# Patient Record
Sex: FEMALE | Race: WHITE | NOT HISPANIC OR LATINO | Employment: OTHER | URBAN - METROPOLITAN AREA
[De-identification: names, ages, dates, MRNs, and addresses within clinical notes are randomized per-mention and may not be internally consistent; named-entity substitution may affect disease eponyms.]

---

## 2023-03-13 LAB
ALANINE AMINOTRANSFERASE (SGPT) (U/L) IN SER/PLAS: 18 U/L (ref 7–45)
ALBUMIN (G/DL) IN SER/PLAS: 4.4 G/DL (ref 3.4–5)
ALKALINE PHOSPHATASE (U/L) IN SER/PLAS: 71 U/L (ref 33–136)
ANION GAP IN SER/PLAS: 14 MMOL/L (ref 10–20)
ASPARTATE AMINOTRANSFERASE (SGOT) (U/L) IN SER/PLAS: 17 U/L (ref 9–39)
BILIRUBIN TOTAL (MG/DL) IN SER/PLAS: 0.7 MG/DL (ref 0–1.2)
CALCIUM (MG/DL) IN SER/PLAS: 10.2 MG/DL (ref 8.6–10.6)
CARBON DIOXIDE, TOTAL (MMOL/L) IN SER/PLAS: 28 MMOL/L (ref 21–32)
CHLORIDE (MMOL/L) IN SER/PLAS: 103 MMOL/L (ref 98–107)
CHOLESTEROL (MG/DL) IN SER/PLAS: 209 MG/DL (ref 0–199)
CHOLESTEROL IN HDL (MG/DL) IN SER/PLAS: 75.8 MG/DL
CHOLESTEROL/HDL RATIO: 2.8
CREATININE (MG/DL) IN SER/PLAS: 0.73 MG/DL (ref 0.5–1.05)
ERYTHROCYTE DISTRIBUTION WIDTH (RATIO) BY AUTOMATED COUNT: 12.7 % (ref 11.5–14.5)
ERYTHROCYTE MEAN CORPUSCULAR HEMOGLOBIN CONCENTRATION (G/DL) BY AUTOMATED: 32.2 G/DL (ref 32–36)
ERYTHROCYTE MEAN CORPUSCULAR VOLUME (FL) BY AUTOMATED COUNT: 90 FL (ref 80–100)
ERYTHROCYTES (10*6/UL) IN BLOOD BY AUTOMATED COUNT: 5.22 X10E12/L (ref 4–5.2)
GFR FEMALE: 86 ML/MIN/1.73M2
GLUCOSE (MG/DL) IN SER/PLAS: 84 MG/DL (ref 74–99)
HEMATOCRIT (%) IN BLOOD BY AUTOMATED COUNT: 46.9 % (ref 36–46)
HEMOGLOBIN (G/DL) IN BLOOD: 15.1 G/DL (ref 12–16)
LDL: 108 MG/DL (ref 0–99)
LEUKOCYTES (10*3/UL) IN BLOOD BY AUTOMATED COUNT: 6.1 X10E9/L (ref 4.4–11.3)
NRBC (PER 100 WBCS) BY AUTOMATED COUNT: 0 /100 WBC (ref 0–0)
PLATELETS (10*3/UL) IN BLOOD AUTOMATED COUNT: 242 X10E9/L (ref 150–450)
POTASSIUM (MMOL/L) IN SER/PLAS: 4 MMOL/L (ref 3.5–5.3)
PROTEIN TOTAL: 6.7 G/DL (ref 6.4–8.2)
SODIUM (MMOL/L) IN SER/PLAS: 141 MMOL/L (ref 136–145)
THYROTROPIN (MIU/L) IN SER/PLAS BY DETECTION LIMIT <= 0.05 MIU/L: 3.05 MIU/L (ref 0.44–3.98)
TRIGLYCERIDE (MG/DL) IN SER/PLAS: 128 MG/DL (ref 0–149)
UREA NITROGEN (MG/DL) IN SER/PLAS: 20 MG/DL (ref 6–23)
VLDL: 26 MG/DL (ref 0–40)

## 2023-10-16 ENCOUNTER — TELEPHONE (OUTPATIENT)
Dept: PRIMARY CARE | Facility: CLINIC | Age: 73
End: 2023-10-16
Payer: MEDICARE

## 2023-10-16 NOTE — TELEPHONE ENCOUNTER
----- Message from Anish Barnes DO sent at 10/16/2023  9:15 AM EDT -----  Cologuard test was negative.  Can repeat in 3 years

## 2024-02-05 ENCOUNTER — OFFICE VISIT (OUTPATIENT)
Dept: PRIMARY CARE | Facility: CLINIC | Age: 74
End: 2024-02-05
Payer: MEDICARE

## 2024-02-05 VITALS
TEMPERATURE: 98 F | HEART RATE: 90 BPM | WEIGHT: 175 LBS | OXYGEN SATURATION: 95 % | SYSTOLIC BLOOD PRESSURE: 138 MMHG | BODY MASS INDEX: 29.88 KG/M2 | DIASTOLIC BLOOD PRESSURE: 78 MMHG | HEIGHT: 64 IN

## 2024-02-05 DIAGNOSIS — E66.9 OBESITY (BMI 30-39.9): ICD-10-CM

## 2024-02-05 DIAGNOSIS — J30.1 SEASONAL ALLERGIC RHINITIS DUE TO POLLEN: ICD-10-CM

## 2024-02-05 DIAGNOSIS — I73.00 RAYNAUD'S DISEASE WITHOUT GANGRENE: ICD-10-CM

## 2024-02-05 DIAGNOSIS — Z12.31 BREAST CANCER SCREENING BY MAMMOGRAM: ICD-10-CM

## 2024-02-05 DIAGNOSIS — Z00.00 WELLNESS EXAMINATION: ICD-10-CM

## 2024-02-05 DIAGNOSIS — Z12.11 ENCOUNTER FOR SCREENING FOR MALIGNANT NEOPLASM OF COLON: ICD-10-CM

## 2024-02-05 DIAGNOSIS — I10 HTN (HYPERTENSION), BENIGN: ICD-10-CM

## 2024-02-05 DIAGNOSIS — E78.2 MIXED HYPERLIPIDEMIA: ICD-10-CM

## 2024-02-05 DIAGNOSIS — L71.9 ROSACEA: Primary | ICD-10-CM

## 2024-02-05 PROCEDURE — 3075F SYST BP GE 130 - 139MM HG: CPT | Performed by: FAMILY MEDICINE

## 2024-02-05 PROCEDURE — 1159F MED LIST DOCD IN RCRD: CPT | Performed by: FAMILY MEDICINE

## 2024-02-05 PROCEDURE — 1036F TOBACCO NON-USER: CPT | Performed by: FAMILY MEDICINE

## 2024-02-05 PROCEDURE — 99214 OFFICE O/P EST MOD 30 MIN: CPT | Performed by: FAMILY MEDICINE

## 2024-02-05 PROCEDURE — 3078F DIAST BP <80 MM HG: CPT | Performed by: FAMILY MEDICINE

## 2024-02-05 RX ORDER — TRIAMTERENE AND HYDROCHLOROTHIAZIDE 37.5; 25 MG/1; MG/1
1 CAPSULE ORAL DAILY
COMMUNITY
End: 2024-02-05 | Stop reason: SDUPTHER

## 2024-02-05 RX ORDER — FLUTICASONE PROPIONATE 50 MCG
2 SPRAY, SUSPENSION (ML) NASAL
COMMUNITY
Start: 2019-11-06 | End: 2024-02-05 | Stop reason: SDUPTHER

## 2024-02-05 RX ORDER — CLOBETASOL PROPIONATE 0.05 MG/G
1 GEL TOPICAL 2 TIMES DAILY
COMMUNITY
Start: 2023-06-28 | End: 2024-02-05 | Stop reason: WASHOUT

## 2024-02-05 RX ORDER — AMLODIPINE BESYLATE 2.5 MG/1
2.5 TABLET ORAL DAILY
Qty: 90 TABLET | Refills: 1 | Status: SHIPPED | OUTPATIENT
Start: 2024-02-05

## 2024-02-05 RX ORDER — PRAVASTATIN SODIUM 80 MG/1
80 TABLET ORAL DAILY
COMMUNITY
End: 2024-02-05 | Stop reason: SDUPTHER

## 2024-02-05 RX ORDER — DOXYCYCLINE HYCLATE 50 MG/1
50 CAPSULE ORAL DAILY
Qty: 90 CAPSULE | Refills: 1 | Status: SHIPPED | OUTPATIENT
Start: 2024-02-05

## 2024-02-05 RX ORDER — ALBUTEROL SULFATE 90 UG/1
AEROSOL, METERED RESPIRATORY (INHALATION)
COMMUNITY
Start: 2022-08-08

## 2024-02-05 RX ORDER — TRIAMTERENE AND HYDROCHLOROTHIAZIDE 37.5; 25 MG/1; MG/1
1 CAPSULE ORAL DAILY
Qty: 90 CAPSULE | Refills: 1 | Status: SHIPPED | OUTPATIENT
Start: 2024-02-05

## 2024-02-05 RX ORDER — FLUTICASONE PROPIONATE 50 MCG
2 SPRAY, SUSPENSION (ML) NASAL
Qty: 16 G | Refills: 11 | Status: SHIPPED | OUTPATIENT
Start: 2024-02-05

## 2024-02-05 RX ORDER — AMLODIPINE BESYLATE 2.5 MG/1
2.5 TABLET ORAL DAILY
COMMUNITY
End: 2024-02-05 | Stop reason: SDUPTHER

## 2024-02-05 RX ORDER — PRAVASTATIN SODIUM 80 MG/1
80 TABLET ORAL DAILY
Qty: 90 TABLET | Refills: 1 | Status: SHIPPED | OUTPATIENT
Start: 2024-02-05

## 2024-02-05 ASSESSMENT — PATIENT HEALTH QUESTIONNAIRE - PHQ9
1. LITTLE INTEREST OR PLEASURE IN DOING THINGS: NOT AT ALL
2. FEELING DOWN, DEPRESSED OR HOPELESS: NOT AT ALL
SUM OF ALL RESPONSES TO PHQ9 QUESTIONS 1 AND 2: 0

## 2024-02-05 NOTE — PROGRESS NOTES
Patient is here 6-month follow-up.  Her  has not quite been the same since he had his appendix removed last year has been falling a lot at home dragging his foot.  This has been a little stressful the patient has got little bit of help I am going on seeing her girlfriends.  He is now also in therapy.  She still having the some redness in the face.  No chest pain or palpitations.    REVIEW OF SYSTEMS: 12 systems negative except for those mentioned in the HPI.    PHYSICAL EXAMINATION:   Constitutional: The patient is alert, in no acute  distress.  Eyes: Extraocular movements are intact.   ENT: external ear canals patent  Neck: no  JVD.  Heart: no JVD  Lungs: Normal respiration without stridor or nasal flaring   Psychiatric: Good judgment and insight. Normal affect and mood.  Skin: Slight redness to the nose and cheeks  Assessment:  per EMR    Plan:  Will try doxycycline for the rosacea.  Blood pressure as well as Flonase cholesterol medication refilled.  Will have annual blood work.  Had a Cologuard that was negative back in October.  Otherwise she is doing well follow-up in 6 months for annual calcium score test was also reviewed    This dictation was created using Dragon dictation and may contain errors

## 2024-02-07 ENCOUNTER — LAB (OUTPATIENT)
Dept: LAB | Facility: LAB | Age: 74
End: 2024-02-07
Payer: MEDICARE

## 2024-02-07 ENCOUNTER — HOSPITAL ENCOUNTER (OUTPATIENT)
Dept: RADIOLOGY | Facility: CLINIC | Age: 74
Discharge: HOME | End: 2024-02-07
Payer: MEDICARE

## 2024-02-07 DIAGNOSIS — I73.00 RAYNAUD'S DISEASE WITHOUT GANGRENE: ICD-10-CM

## 2024-02-07 DIAGNOSIS — Z12.31 BREAST CANCER SCREENING BY MAMMOGRAM: ICD-10-CM

## 2024-02-07 DIAGNOSIS — Z00.00 WELLNESS EXAMINATION: ICD-10-CM

## 2024-02-07 DIAGNOSIS — E78.2 MIXED HYPERLIPIDEMIA: ICD-10-CM

## 2024-02-07 DIAGNOSIS — I10 HTN (HYPERTENSION), BENIGN: ICD-10-CM

## 2024-02-07 DIAGNOSIS — L71.9 ROSACEA: ICD-10-CM

## 2024-02-07 LAB
CREAT UR-MCNC: 169.4 MG/DL (ref 20–320)
MICROALBUMIN UR-MCNC: 12.6 MG/L
MICROALBUMIN/CREAT UR: 7.4 UG/MG CREAT

## 2024-02-07 PROCEDURE — 77063 BREAST TOMOSYNTHESIS BI: CPT | Performed by: RADIOLOGY

## 2024-02-07 PROCEDURE — 82570 ASSAY OF URINE CREATININE: CPT

## 2024-02-07 PROCEDURE — 77067 SCR MAMMO BI INCL CAD: CPT | Performed by: RADIOLOGY

## 2024-02-07 PROCEDURE — 77067 SCR MAMMO BI INCL CAD: CPT

## 2024-02-07 PROCEDURE — 82043 UR ALBUMIN QUANTITATIVE: CPT

## 2024-02-13 ENCOUNTER — HOSPITAL ENCOUNTER (OUTPATIENT)
Dept: RADIOLOGY | Facility: EXTERNAL LOCATION | Age: 74
Discharge: HOME | End: 2024-02-13

## 2024-04-02 PROBLEM — R79.89 ELEVATED FERRITIN: Status: ACTIVE | Noted: 2024-04-02

## 2024-04-03 ENCOUNTER — LAB (OUTPATIENT)
Dept: LAB | Facility: CLINIC | Age: 74
End: 2024-04-03
Payer: MEDICARE

## 2024-04-03 DIAGNOSIS — E78.2 MIXED HYPERLIPIDEMIA: ICD-10-CM

## 2024-04-03 DIAGNOSIS — I73.00 RAYNAUD'S DISEASE WITHOUT GANGRENE: ICD-10-CM

## 2024-04-03 DIAGNOSIS — I10 HTN (HYPERTENSION), BENIGN: ICD-10-CM

## 2024-04-03 DIAGNOSIS — R79.89 ELEVATED FERRITIN: ICD-10-CM

## 2024-04-03 DIAGNOSIS — D75.1 ERYTHROCYTOSIS: ICD-10-CM

## 2024-04-03 DIAGNOSIS — Z00.00 WELLNESS EXAMINATION: ICD-10-CM

## 2024-04-03 DIAGNOSIS — L71.9 ROSACEA: ICD-10-CM

## 2024-04-03 LAB
ALBUMIN SERPL BCP-MCNC: 4.3 G/DL (ref 3.4–5)
ALP SERPL-CCNC: 69 U/L (ref 33–136)
ALT SERPL W P-5'-P-CCNC: 15 U/L (ref 7–45)
ANION GAP SERPL CALC-SCNC: 10 MMOL/L (ref 10–20)
AST SERPL W P-5'-P-CCNC: 15 U/L (ref 9–39)
BASOPHILS # BLD AUTO: 0.03 X10*3/UL (ref 0–0.1)
BASOPHILS NFR BLD AUTO: 0.6 %
BILIRUB SERPL-MCNC: 0.7 MG/DL (ref 0–1.2)
BUN SERPL-MCNC: 23 MG/DL (ref 6–23)
CALCIUM SERPL-MCNC: 10.2 MG/DL (ref 8.6–10.3)
CHLORIDE SERPL-SCNC: 102 MMOL/L (ref 98–107)
CHOLEST SERPL-MCNC: 200 MG/DL (ref 0–199)
CHOLESTEROL/HDL RATIO: 2.7
CO2 SERPL-SCNC: 31 MMOL/L (ref 21–32)
CREAT SERPL-MCNC: 0.85 MG/DL (ref 0.5–1.05)
EGFRCR SERPLBLD CKD-EPI 2021: 72 ML/MIN/1.73M*2
EOSINOPHIL # BLD AUTO: 0.15 X10*3/UL (ref 0–0.4)
EOSINOPHIL NFR BLD AUTO: 2.9 %
ERYTHROCYTE [DISTWIDTH] IN BLOOD BY AUTOMATED COUNT: 12.8 % (ref 11.5–14.5)
FERRITIN SERPL-MCNC: 148 NG/ML (ref 8–150)
GLUCOSE SERPL-MCNC: 92 MG/DL (ref 74–99)
HCT VFR BLD AUTO: 47.3 % (ref 36–46)
HDLC SERPL-MCNC: 74.7 MG/DL
HGB BLD-MCNC: 15.4 G/DL (ref 12–16)
IMM GRANULOCYTES # BLD AUTO: 0.01 X10*3/UL (ref 0–0.5)
IMM GRANULOCYTES NFR BLD AUTO: 0.2 % (ref 0–0.9)
IRON SATN MFR SERPL: 30 % (ref 25–45)
IRON SERPL-MCNC: 89 UG/DL (ref 35–150)
LDLC SERPL CALC-MCNC: 102 MG/DL
LYMPHOCYTES # BLD AUTO: 1.88 X10*3/UL (ref 0.8–3)
LYMPHOCYTES NFR BLD AUTO: 36 %
MCH RBC QN AUTO: 29.2 PG (ref 26–34)
MCHC RBC AUTO-ENTMCNC: 32.6 G/DL (ref 32–36)
MCV RBC AUTO: 90 FL (ref 80–100)
MONOCYTES # BLD AUTO: 0.34 X10*3/UL (ref 0.05–0.8)
MONOCYTES NFR BLD AUTO: 6.5 %
NEUTROPHILS # BLD AUTO: 2.81 X10*3/UL (ref 1.6–5.5)
NEUTROPHILS NFR BLD AUTO: 53.8 %
NON HDL CHOLESTEROL: 125 MG/DL (ref 0–149)
PLATELET # BLD AUTO: 236 X10*3/UL (ref 150–450)
POTASSIUM SERPL-SCNC: 3.6 MMOL/L (ref 3.5–5.3)
PROT SERPL-MCNC: 6.9 G/DL (ref 6.4–8.2)
RBC # BLD AUTO: 5.27 X10*6/UL (ref 4–5.2)
SODIUM SERPL-SCNC: 139 MMOL/L (ref 136–145)
TIBC SERPL-MCNC: 300 UG/DL (ref 240–445)
TRIGL SERPL-MCNC: 115 MG/DL (ref 0–149)
UIBC SERPL-MCNC: 211 UG/DL (ref 110–370)
VLDL: 23 MG/DL (ref 0–40)
WBC # BLD AUTO: 5.2 X10*3/UL (ref 4.4–11.3)

## 2024-04-03 PROCEDURE — 36415 COLL VENOUS BLD VENIPUNCTURE: CPT

## 2024-04-03 PROCEDURE — 82728 ASSAY OF FERRITIN: CPT

## 2024-04-03 PROCEDURE — 85025 COMPLETE CBC W/AUTO DIFF WBC: CPT

## 2024-04-03 PROCEDURE — 80053 COMPREHEN METABOLIC PANEL: CPT

## 2024-04-03 PROCEDURE — 80061 LIPID PANEL: CPT

## 2024-04-03 PROCEDURE — 83540 ASSAY OF IRON: CPT

## 2024-04-05 ENCOUNTER — OFFICE VISIT (OUTPATIENT)
Dept: HEMATOLOGY/ONCOLOGY | Facility: CLINIC | Age: 74
End: 2024-04-05
Payer: MEDICARE

## 2024-04-05 VITALS
TEMPERATURE: 98.1 F | HEART RATE: 82 BPM | BODY MASS INDEX: 30.28 KG/M2 | WEIGHT: 175.04 LBS | OXYGEN SATURATION: 97 % | RESPIRATION RATE: 18 BRPM | DIASTOLIC BLOOD PRESSURE: 79 MMHG | SYSTOLIC BLOOD PRESSURE: 154 MMHG

## 2024-04-05 DIAGNOSIS — E83.110 HEREDITARY HEMOCHROMATOSIS (CMS-HCC): Primary | ICD-10-CM

## 2024-04-05 DIAGNOSIS — E78.2 MIXED HYPERLIPIDEMIA: ICD-10-CM

## 2024-04-05 DIAGNOSIS — I10 HTN (HYPERTENSION), BENIGN: ICD-10-CM

## 2024-04-05 DIAGNOSIS — D75.1 ERYTHROCYTOSIS: ICD-10-CM

## 2024-04-05 DIAGNOSIS — R79.89 ELEVATED FERRITIN: ICD-10-CM

## 2024-04-05 DIAGNOSIS — J30.1 SEASONAL ALLERGIC RHINITIS DUE TO POLLEN: ICD-10-CM

## 2024-04-05 PROCEDURE — 1126F AMNT PAIN NOTED NONE PRSNT: CPT | Performed by: INTERNAL MEDICINE

## 2024-04-05 PROCEDURE — 99214 OFFICE O/P EST MOD 30 MIN: CPT | Performed by: INTERNAL MEDICINE

## 2024-04-05 PROCEDURE — 1159F MED LIST DOCD IN RCRD: CPT | Performed by: INTERNAL MEDICINE

## 2024-04-05 PROCEDURE — 3078F DIAST BP <80 MM HG: CPT | Performed by: INTERNAL MEDICINE

## 2024-04-05 PROCEDURE — 3077F SYST BP >= 140 MM HG: CPT | Performed by: INTERNAL MEDICINE

## 2024-04-05 RX ORDER — OXYMETAZOLINE HCL 0.05 %
SPRAY, NON-AEROSOL (ML) NASAL
COMMUNITY

## 2024-04-05 ASSESSMENT — PAIN SCALES - GENERAL: PAINLEVEL: 0-NO PAIN

## 2024-04-05 ASSESSMENT — ENCOUNTER SYMPTOMS
CARDIOVASCULAR NEGATIVE: 1
GASTROINTESTINAL NEGATIVE: 1
ENDOCRINE NEGATIVE: 1
RESPIRATORY NEGATIVE: 1
CONSTITUTIONAL NEGATIVE: 1
EYES NEGATIVE: 1
NEUROLOGICAL NEGATIVE: 1
PSYCHIATRIC NEGATIVE: 1
MUSCULOSKELETAL NEGATIVE: 1
HEMATOLOGIC/LYMPHATIC NEGATIVE: 1

## 2024-04-05 NOTE — PROGRESS NOTES
Patient ID: Estela Stinson is a 73 y.o. female.  Referring Physician: No referring provider defined for this encounter.  Primary Care Provider: Anish Barnes,   Visit Type: Follow Up      Subjective    HPI How was my labwork?    Review of Systems   Constitutional: Negative.    HENT:  Negative.     Eyes: Negative.    Respiratory: Negative.     Cardiovascular: Negative.    Gastrointestinal: Negative.    Endocrine: Negative.    Genitourinary: Negative.     Musculoskeletal: Negative.    Skin: Negative.    Neurological: Negative.    Hematological: Negative.    Psychiatric/Behavioral: Negative.          Objective   BSA: 1.89 meters squared  /79 (BP Location: Right arm)   Pulse 82   Temp 36.7 °C (98.1 °F) (Temporal)   Resp 18   Wt 79.4 kg (175 lb 0.7 oz)   SpO2 97%   BMI 30.28 kg/m²      has no past medical history on file.   has a past surgical history that includes Other surgical history (01/25/2022) and Other surgical history (08/08/2022).  No family history on file.  Oncology History    No history exists.       Estela Stinson  reports that she has never smoked. She has never used smokeless tobacco.  She  reports current alcohol use of about 1.0 standard drink of alcohol per week.  She  reports no history of drug use.    Physical Exam  Vitals reviewed.   Constitutional:       Appearance: Normal appearance.   HENT:      Head: Normocephalic.      Mouth/Throat:      Mouth: Mucous membranes are moist.   Eyes:      Extraocular Movements: Extraocular movements intact.      Pupils: Pupils are equal, round, and reactive to light.   Cardiovascular:      Rate and Rhythm: Normal rate and regular rhythm.      Heart sounds: Normal heart sounds.   Pulmonary:      Breath sounds: Normal breath sounds.   Abdominal:      General: Bowel sounds are normal.      Palpations: Abdomen is soft.   Musculoskeletal:         General: Normal range of motion.      Cervical back: Normal range of motion and neck supple.   Skin:      General: Skin is warm.   Neurological:      General: No focal deficit present.      Mental Status: She is alert and oriented to person, place, and time.   Psychiatric:         Mood and Affect: Mood normal.         Behavior: Behavior normal.         WBC   Date/Time Value Ref Range Status   04/03/2024 08:34 AM 5.2 4.4 - 11.3 x10*3/uL Final   03/31/2023 08:48 AM 6.5 4.4 - 11.3 x10E9/L Final   03/13/2023 09:30 AM 6.1 4.4 - 11.3 x10E9/L Final   03/15/2022 12:25 PM 7.8 4.4 - 11.3 x10E9/L Final     nRBC   Date Value Ref Range Status   03/13/2023 0.0 0.0 - 0.0 /100 WBC Final     RBC   Date Value Ref Range Status   04/03/2024 5.27 (H) 4.00 - 5.20 x10*6/uL Final   03/31/2023 5.31 (H) 4.00 - 5.20 x10E12/L Final   03/13/2023 5.22 (H) 4.00 - 5.20 x10E12/L Final   03/15/2022 5.38 (H) 4.00 - 5.20 x10E12/L Final     Hemoglobin   Date Value Ref Range Status   04/03/2024 15.4 12.0 - 16.0 g/dL Final   03/31/2023 15.5 12.0 - 16.0 g/dL Final   03/13/2023 15.1 12.0 - 16.0 g/dL Final   03/15/2022 15.7 12.0 - 16.0 g/dL Final     Hematocrit   Date Value Ref Range Status   04/03/2024 47.3 (H) 36.0 - 46.0 % Final   03/31/2023 47.2 (H) 36.0 - 46.0 % Final   03/13/2023 46.9 (H) 36.0 - 46.0 % Final   03/15/2022 48.3 (H) 36.0 - 46.0 % Final     MCV   Date/Time Value Ref Range Status   04/03/2024 08:34 AM 90 80 - 100 fL Final   03/31/2023 08:48 AM 89 80 - 100 fL Final   03/13/2023 09:30 AM 90 80 - 100 fL Final   03/15/2022 12:25 PM 90 80 - 100 fL Final     MCH   Date/Time Value Ref Range Status   04/03/2024 08:34 AM 29.2 26.0 - 34.0 pg Final     MCHC   Date/Time Value Ref Range Status   04/03/2024 08:34 AM 32.6 32.0 - 36.0 g/dL Final   03/31/2023 08:48 AM 32.8 32.0 - 36.0 g/dL Final   03/13/2023 09:30 AM 32.2 32.0 - 36.0 g/dL Final   03/15/2022 12:25 PM 32.5 32.0 - 36.0 g/dL Final     RDW   Date/Time Value Ref Range Status   04/03/2024 08:34 AM 12.8 11.5 - 14.5 % Final   03/31/2023 08:48 AM 12.8 11.5 - 14.5 % Final   03/13/2023 09:30 AM 12.7 11.5  "- 14.5 % Final   03/15/2022 12:25 PM 12.7 11.5 - 14.5 % Final     Platelets   Date/Time Value Ref Range Status   04/03/2024 08:34  150 - 450 x10*3/uL Final   03/31/2023 08:48  150 - 450 x10E9/L Final   03/13/2023 09:30  150 - 450 x10E9/L Final   03/15/2022 12:25  150 - 450 x10E9/L Final     No results found for: \"MPV\"  Neutrophils %   Date/Time Value Ref Range Status   04/03/2024 08:34 AM 53.8 40.0 - 80.0 % Final   03/31/2023 08:48 AM 53.4 40.0 - 80.0 % Final   03/15/2022 12:25 PM 71.3 40.0 - 80.0 % Final     Immature Granulocytes %, Automated   Date/Time Value Ref Range Status   04/03/2024 08:34 AM 0.2 0.0 - 0.9 % Final     Comment:     Immature Granulocyte Count (IG) includes promyelocytes, myelocytes and metamyelocytes but does not include bands. Percent differential counts (%) should be interpreted in the context of the absolute cell counts (cells/UL).   03/31/2023 08:48 AM 0.0 0.0 - 0.9 % Final     Comment:      Immature Granulocyte Count (IG) includes promyelocytes,    myelocytes and metamyelocytes but does not include bands.   Percent differential counts (%) should be interpreted in the   context of the absolute cell counts (cells/L).     03/15/2022 12:25 PM 0.3 0.0 - 0.9 % Final     Comment:      Immature Granulocyte Count (IG) includes promyelocytes,    myelocytes and metamyelocytes but does not include bands.   Percent differential counts (%) should be interpreted in the   context of the absolute cell counts (cells/L).       Lymphocytes %   Date/Time Value Ref Range Status   04/03/2024 08:34 AM 36.0 13.0 - 44.0 % Final   03/31/2023 08:48 AM 37.3 13.0 - 44.0 % Final   03/15/2022 12:25 PM 20.7 13.0 - 44.0 % Final     Monocytes %   Date/Time Value Ref Range Status   04/03/2024 08:34 AM 6.5 2.0 - 10.0 % Final   03/31/2023 08:48 AM 5.9 2.0 - 10.0 % Final   03/15/2022 12:25 PM 6.2 2.0 - 10.0 % Final     Eosinophils %   Date/Time Value Ref Range Status   04/03/2024 08:34 AM 2.9 0.0 - 6.0 % " "Final   03/31/2023 08:48 AM 2.9 0.0 - 6.0 % Final   03/15/2022 12:25 PM 1.0 0.0 - 6.0 % Final     Basophils %   Date/Time Value Ref Range Status   04/03/2024 08:34 AM 0.6 0.0 - 2.0 % Final   03/31/2023 08:48 AM 0.5 0.0 - 2.0 % Final   03/15/2022 12:25 PM 0.5 0.0 - 2.0 % Final     Neutrophils Absolute   Date/Time Value Ref Range Status   04/03/2024 08:34 AM 2.81 1.60 - 5.50 x10*3/uL Final     Comment:     Percent differential counts (%) should be interpreted in the context of the absolute cell counts (cells/uL).   03/31/2023 08:48 AM 3.45 1.60 - 5.50 x10E9/L Final   03/15/2022 12:25 PM 5.56 (H) 1.60 - 5.50 x10E9/L Final     Immature Granulocytes Absolute, Automated   Date/Time Value Ref Range Status   04/03/2024 08:34 AM 0.01 0.00 - 0.50 x10*3/uL Final     Lymphocytes Absolute   Date/Time Value Ref Range Status   04/03/2024 08:34 AM 1.88 0.80 - 3.00 x10*3/uL Final   03/31/2023 08:48 AM 2.41 0.80 - 3.00 x10E9/L Final   03/15/2022 12:25 PM 1.61 0.80 - 3.00 x10E9/L Final     Monocytes Absolute   Date/Time Value Ref Range Status   04/03/2024 08:34 AM 0.34 0.05 - 0.80 x10*3/uL Final   03/31/2023 08:48 AM 0.38 0.05 - 0.80 x10E9/L Final   03/15/2022 12:25 PM 0.48 0.05 - 0.80 x10E9/L Final     Eosinophils Absolute   Date/Time Value Ref Range Status   04/03/2024 08:34 AM 0.15 0.00 - 0.40 x10*3/uL Final   03/31/2023 08:48 AM 0.19 0.00 - 0.40 x10E9/L Final   03/15/2022 12:25 PM 0.08 0.00 - 0.40 x10E9/L Final     Basophils Absolute   Date/Time Value Ref Range Status   04/03/2024 08:34 AM 0.03 0.00 - 0.10 x10*3/uL Final   03/31/2023 08:48 AM 0.03 0.00 - 0.10 x10E9/L Final   03/15/2022 12:25 PM 0.04 0.00 - 0.10 x10E9/L Final       No components found for: \"PT\"  No results found for: \"APTT\"  Medication Documentation Review Audit       Reviewed by Annabelle Panchal MA (Medical Assistant) on 04/05/24 at 0928      Medication Order Taking? Sig Documenting Provider Last Dose Status   albuterol 90 mcg/actuation inhaler [id number] Yes INHALE " ONE PUFF BY MOUTH EVERY 4 HOURS AS NEEDED FOR COUGH / WHEEZE. Historical Provider, MD Taking Active   amLODIPine (Norvasc) 2.5 mg tablet [id number] Yes Take 1 tablet (2.5 mg) by mouth once daily. Anish Barnes, DO Taking Active   doxycycline (Vibramycin) 50 mg capsule [id number] Yes Take 1 capsule (50 mg) by mouth once daily. Take with at least 8 ounces (large glass) of water, do not lie down for 30 minutes after Anish Barnes, DO Taking Active   fluticasone (Flonase) 50 mcg/actuation nasal spray [id number] Yes Administer 2 sprays into each nostril once daily. Anish Barnes, DO Taking Active   oxymetazoline (Afrin) 0.05 % nasal spray [id number] Yes Administer into each nostril. Do not use for more than 3 days. Historical Provider, MD Taking Active   pravastatin (Pravachol) 80 mg tablet [id number] Yes Take 1 tablet (80 mg) by mouth once daily. Anish Barnes,  Taking Active   triamterene-hydrochlorothiazid (Dyazide) 37.5-25 mg capsule [id number] Yes Take 1 capsule by mouth once daily. 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  -labs done on 4/3/2024 included CBC, COMP, iron panel + ferritin  -results reviewed--wbc 5.2, hgb 15.4, plt 236,000, creatinine 0.85, alk phos 69, AST 15, ALT 15, TIBC 300, ferritin 148, sat 30%  -reviewed criteria for initiation of therapeutic phlebotomy--ferritin >500, sat >60%  -as her numbers have not reached threshold, will continue to follow annually  -she is doing well, has no complaints     2) hyperlipidemia  -on pravastatin     3) hypertension  -on dyazide  -on amlodipine    4) seasonal allergies  -on flonase  -on afrin  -on albuterol  Problem List Items Addressed This Visit             ICD-10-CM    Elevated ferritin R79.89    Relevant Orders    CBC and Auto Differential    Comprehensive Metabolic Panel    Iron and TIBC (Completed)    Ferritin  (Completed)     Other Visit Diagnoses         Codes    Hereditary hemochromatosis (CMS/HCC)    -  Primary E83.110    Relevant Orders    Clinic Appointment Request Follow Up; [facility]; [id number]    CBC and Auto Differential    Comprehensive metabolic panel    Iron and TIBC    Ferritin    Erythrocytosis     D75.1    Relevant Orders    CBC and Auto Differential    Comprehensive Metabolic Panel    Iron and TIBC (Completed)    Ferritin (Completed)                 Schuyler Muller MD

## 2024-08-05 ENCOUNTER — APPOINTMENT (OUTPATIENT)
Dept: PRIMARY CARE | Facility: CLINIC | Age: 74
End: 2024-08-05
Payer: MEDICARE

## 2024-08-05 VITALS
SYSTOLIC BLOOD PRESSURE: 132 MMHG | WEIGHT: 174 LBS | HEIGHT: 64 IN | TEMPERATURE: 97.1 F | BODY MASS INDEX: 29.71 KG/M2 | DIASTOLIC BLOOD PRESSURE: 88 MMHG | HEART RATE: 82 BPM

## 2024-08-05 DIAGNOSIS — L71.9 ROSACEA: ICD-10-CM

## 2024-08-05 DIAGNOSIS — E78.2 MIXED HYPERLIPIDEMIA: ICD-10-CM

## 2024-08-05 DIAGNOSIS — E66.9 OBESITY (BMI 30-39.9): ICD-10-CM

## 2024-08-05 DIAGNOSIS — R91.8 LUNG NODULES: ICD-10-CM

## 2024-08-05 DIAGNOSIS — I10 HTN (HYPERTENSION), BENIGN: ICD-10-CM

## 2024-08-05 DIAGNOSIS — Z00.00 WELLNESS EXAMINATION: Primary | ICD-10-CM

## 2024-08-05 DIAGNOSIS — I73.00 RAYNAUD'S DISEASE WITHOUT GANGRENE: ICD-10-CM

## 2024-08-05 PROCEDURE — G0444 DEPRESSION SCREEN ANNUAL: HCPCS | Performed by: FAMILY MEDICINE

## 2024-08-05 PROCEDURE — 3079F DIAST BP 80-89 MM HG: CPT | Performed by: FAMILY MEDICINE

## 2024-08-05 PROCEDURE — 3075F SYST BP GE 130 - 139MM HG: CPT | Performed by: FAMILY MEDICINE

## 2024-08-05 PROCEDURE — 1158F ADVNC CARE PLAN TLK DOCD: CPT | Performed by: FAMILY MEDICINE

## 2024-08-05 PROCEDURE — G0296 VISIT TO DETERM LDCT ELIG: HCPCS | Performed by: FAMILY MEDICINE

## 2024-08-05 PROCEDURE — G0439 PPPS, SUBSEQ VISIT: HCPCS | Performed by: FAMILY MEDICINE

## 2024-08-05 PROCEDURE — G0446 INTENS BEHAVE THER CARDIO DX: HCPCS | Performed by: FAMILY MEDICINE

## 2024-08-05 PROCEDURE — 99214 OFFICE O/P EST MOD 30 MIN: CPT | Performed by: FAMILY MEDICINE

## 2024-08-05 PROCEDURE — 1036F TOBACCO NON-USER: CPT | Performed by: FAMILY MEDICINE

## 2024-08-05 PROCEDURE — 1159F MED LIST DOCD IN RCRD: CPT | Performed by: FAMILY MEDICINE

## 2024-08-05 PROCEDURE — 1123F ACP DISCUSS/DSCN MKR DOCD: CPT | Performed by: FAMILY MEDICINE

## 2024-08-05 PROCEDURE — 99497 ADVNCD CARE PLAN 30 MIN: CPT | Performed by: FAMILY MEDICINE

## 2024-08-05 RX ORDER — AMLODIPINE BESYLATE 2.5 MG/1
2.5 TABLET ORAL DAILY
Qty: 90 TABLET | Refills: 1 | Status: SHIPPED | OUTPATIENT
Start: 2024-08-05

## 2024-08-05 RX ORDER — PRAVASTATIN SODIUM 80 MG/1
80 TABLET ORAL DAILY
Qty: 90 TABLET | Refills: 1 | Status: SHIPPED | OUTPATIENT
Start: 2024-08-05

## 2024-08-05 RX ORDER — DOXYCYCLINE HYCLATE 50 MG/1
50 CAPSULE ORAL DAILY
Qty: 90 CAPSULE | Refills: 1 | Status: SHIPPED | OUTPATIENT
Start: 2024-08-05

## 2024-08-05 RX ORDER — TRIAMTERENE AND HYDROCHLOROTHIAZIDE 37.5; 25 MG/1; MG/1
1 CAPSULE ORAL DAILY
Qty: 90 CAPSULE | Refills: 1 | Status: SHIPPED | OUTPATIENT
Start: 2024-08-05

## 2024-08-05 NOTE — PROGRESS NOTES
Advance Care Planning Note     Discussion Date: 08/05/24   Discussion Participants: patient    The patient wishes to discuss Advance Care Planning today and the following is a brief summary of our discussion.     Patient has capacity to make their own medical decisions: Yes  Health Care Agent/Surrogate Decision Maker documented in chart: Yes    Documents on file and valid:  Advance Directive/Living Will: Yes   Health Care Power of : Yes  Other:  then daughter    Communication of Medical Status/Prognosis:   tbs     Communication of Treatment Goals/Options:   tbs     Treatment Decisions  Goals of Care: survival is paramount regardless of prognosis, treatment outcome, or burden   tbs  Follow Up Plan  tbs  Team Members  tbs  Time Statement: Total face to face time spent on advance care planning was >15 minutes with 16 minutes spent in counseling, including the explanation.  Cardiac risk is less than 4% even though the ASCVD risk score is 22.3% we did do a calcium score test last year that is reviewed in detail greater than 10 minutes.  Also did discuss with the patient the nodules and also the greater than 9 mm nodule found along with the lymph nodes for granulomatosis disease.  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  With this in greater than 10 minutes we will go ahead and do a chest CT for lung screening and follow-up is recommended in the chart PHQ-9 greater than 5 minutes also reviewed    Anish Barnes,   8/5/2024 8:37 AM    Patient is here for annual wellness also needs refill of cholesterol medication has been stable.  Hypertension medication has been stable.  Rosacea has been stable since starting the doxycycline and she is I really enjoyed this medication with no side effects.    REVIEW OF SYSTEMS: 12 systems negative except for those mentioned in the HPI. Reviewed home risks with patient. Patient feels safe at home.    PHYSICAL EXAMINATION:    Constitutional: The patient is alert and oriented x3, in no acute  distress.  Eyes: Extraocular movements are intact. Pupils are equal and reactive to light  ENT: Bilateral TMs within normal limits. Nasal turbinates are within normal limits. Throat within normal limits.  Neck: Supple without lymphadenopathy or JVD.  Heart: Regular rate and rhythm without murmur, click, gallop, or rub.  Lungs: Clear to auscultation bilaterally. No rales, rhonchi, or  wheezing.  Abdomen: Soft, nontender, nondistended. Normoactive bowel sounds.   No palpable masses. Normal percussion  Musculoskeletal: 5/5 motor, upper and lower extremities.  Neurologic: Cranial nerves II through XII fully intact. +2/4 DTRs  in ankle and knee.  Psychiatric: Good judgment and insight. Normal affect and mood. No cognitive defects noted.  Lymphatic: Negative as noted above.  Skin: no rashes or lesions    Assessment:  Per EMR    Plan:  Annual screenings as noted above including repeat chest.    Hypertension well-controlled medication refilled.  Cholesterol stable medication refilled.  Rosacea stable medication refilled follow-up in 6 months  Discussed with the patient and reviewed annual wellness questionnaire form as well as cognitive deficits. Also discussed with the patient immunizations and risks for colonoscopy and other screening procedures    This dictation was created using Dragon dictation and may contain errors

## 2024-08-20 ENCOUNTER — HOSPITAL ENCOUNTER (OUTPATIENT)
Dept: RADIOLOGY | Facility: HOSPITAL | Age: 74
Discharge: HOME | End: 2024-08-20
Payer: MEDICARE

## 2024-08-20 DIAGNOSIS — R91.8 LUNG NODULES: ICD-10-CM

## 2024-08-20 PROCEDURE — 71250 CT THORAX DX C-: CPT

## 2024-08-27 DIAGNOSIS — R91.8 LUNG NODULES: Primary | ICD-10-CM

## 2024-09-10 PROBLEM — L23.7 CONTACT DERMATITIS DUE TO POISON IVY: Status: ACTIVE | Noted: 2024-09-10

## 2024-09-10 PROBLEM — E66.9 OBESITY WITH BODY MASS INDEX 30 OR GREATER: Status: ACTIVE | Noted: 2024-09-10

## 2024-09-10 PROBLEM — J20.9 ACUTE BRONCHITIS: Status: ACTIVE | Noted: 2024-09-10

## 2024-09-10 PROBLEM — R21 RASH: Status: ACTIVE | Noted: 2024-09-10

## 2024-09-10 PROBLEM — D58.2 ELEVATED HEMOGLOBIN (CMS-HCC): Status: ACTIVE | Noted: 2024-09-10

## 2024-09-16 NOTE — PROGRESS NOTES
"Subjective   Estela Stinson  is a 74 y.o. female who presents for evaluation of multiple lung nodules - Largest is a 9 mm RLL.     This patient received a calcium scoring CT scan on 3/1/2023 which incidentally detected multiple subcentimeter lung nodules.  This was felt to be related to \"remote granulomatous disease\".  She received a follow-up CT scan on 8/20/2024 which showed multiple nodules the largest of which measured 9 mm and was \"similar to prior exam\".  There was a recommendation to get follow-up imaging at 3 to 6 months.    Currently the patient is in their usual state of health. She denies the following symptoms: chest pain, shortness of breath at rest, shortness of breath with activity, cough, hemoptysis, fevers, chills, and weight loss.      Medical history is notable for no history of MI, CVA or other major cardiovascular disease. She has no history of prior chest surgery.   She has no history of prior cancer.  She has a first degree relatives with lung cancer (mother).     She  reports that she has never smoked. She has never used smokeless tobacco. She reports current alcohol use of about 1.0 standard drink of alcohol per week. She reports that she does not use drugs.    Objective   Physical Exam  The patient is well-appearing and in no acute distress. The trachea is midline and there is no crepitus. The lungs were clear to auscultation grossly. There was good effort and excursion. The heart had a regular rate and rhythm. The abdomen was soft, nontender and nondistended. The extremities had no edema or gross deformities. Mood and affect are appropriate.  Diagnostic Studies  === 08/20/24 ===    CT CHEST WO IV CONTRAST    - Impression -  1.  Multiple bilateral lung nodules measuring up to 9 mm.  2. Consider follow up non contrast chest CT at 3-6 months.Then  management based on most suspicious nodule(s).(Issa Chaves et al.,  Guidelines for management of incidental pulmonary nodules detected on  CT " images: From the Fleischner Society 2017, Radiology. 2017 Jul;284  (1):228-243.)    MACRO:  None    Signed by: Kory Man 8/26/2024 3:50 PM  Dictation workstation:   [id number]    Assessment/Plan   I believe that the patient has a lung nodule of unclear etiology.     Based on the patient's clinical presentation and my review of their radiographic imaging, I believe the lung nodule is unlikely to represent a malignant process (based on the lack of change between 3/23 and now).  We discussed various management strategies including surgery, biopsy, and observation.  Based on this discussion, the patient elected for observational management.  I recommend serial radiographic surveillance.    I recommend CT chest in 9 months    I discussed this in detail with the patient, including a discussion of alternatives. They were comfortable with this approach.     Tray Olivera MD  [phone]

## 2024-09-18 ENCOUNTER — OFFICE VISIT (OUTPATIENT)
Dept: SURGERY | Facility: CLINIC | Age: 74
End: 2024-09-18
Payer: MEDICARE

## 2024-09-18 VITALS
TEMPERATURE: 98.1 F | DIASTOLIC BLOOD PRESSURE: 84 MMHG | HEIGHT: 63 IN | WEIGHT: 174.2 LBS | SYSTOLIC BLOOD PRESSURE: 153 MMHG | OXYGEN SATURATION: 97 % | BODY MASS INDEX: 30.87 KG/M2 | HEART RATE: 73 BPM

## 2024-09-18 DIAGNOSIS — R91.8 LUNG NODULES: ICD-10-CM

## 2024-09-18 DIAGNOSIS — R91.1 LUNG NODULE: ICD-10-CM

## 2024-09-18 PROCEDURE — 99205 OFFICE O/P NEW HI 60 MIN: CPT | Performed by: THORACIC SURGERY (CARDIOTHORACIC VASCULAR SURGERY)

## 2024-09-18 PROCEDURE — 1036F TOBACCO NON-USER: CPT | Performed by: THORACIC SURGERY (CARDIOTHORACIC VASCULAR SURGERY)

## 2024-09-18 PROCEDURE — 1159F MED LIST DOCD IN RCRD: CPT | Performed by: THORACIC SURGERY (CARDIOTHORACIC VASCULAR SURGERY)

## 2024-09-18 PROCEDURE — 1123F ACP DISCUSS/DSCN MKR DOCD: CPT | Performed by: THORACIC SURGERY (CARDIOTHORACIC VASCULAR SURGERY)

## 2024-09-18 PROCEDURE — 3079F DIAST BP 80-89 MM HG: CPT | Performed by: THORACIC SURGERY (CARDIOTHORACIC VASCULAR SURGERY)

## 2024-09-18 PROCEDURE — 3077F SYST BP >= 140 MM HG: CPT | Performed by: THORACIC SURGERY (CARDIOTHORACIC VASCULAR SURGERY)

## 2024-09-18 PROCEDURE — 99215 OFFICE O/P EST HI 40 MIN: CPT | Performed by: THORACIC SURGERY (CARDIOTHORACIC VASCULAR SURGERY)

## 2024-09-18 PROCEDURE — 1126F AMNT PAIN NOTED NONE PRSNT: CPT | Performed by: THORACIC SURGERY (CARDIOTHORACIC VASCULAR SURGERY)

## 2024-09-18 SDOH — ECONOMIC STABILITY: FOOD INSECURITY: WITHIN THE PAST 12 MONTHS, YOU WORRIED THAT YOUR FOOD WOULD RUN OUT BEFORE YOU GOT MONEY TO BUY MORE.: NEVER TRUE

## 2024-09-18 SDOH — ECONOMIC STABILITY: FOOD INSECURITY: WITHIN THE PAST 12 MONTHS, THE FOOD YOU BOUGHT JUST DIDN'T LAST AND YOU DIDN'T HAVE MONEY TO GET MORE.: NEVER TRUE

## 2024-09-18 ASSESSMENT — LIFESTYLE VARIABLES
HOW OFTEN DO YOU HAVE A DRINK CONTAINING ALCOHOL: 2-4 TIMES A MONTH
SKIP TO QUESTIONS 9-10: 1
HOW OFTEN DO YOU HAVE SIX OR MORE DRINKS ON ONE OCCASION: NEVER
HOW MANY STANDARD DRINKS CONTAINING ALCOHOL DO YOU HAVE ON A TYPICAL DAY: 1 OR 2
AUDIT-C TOTAL SCORE: 2

## 2024-09-18 ASSESSMENT — ENCOUNTER SYMPTOMS
OCCASIONAL FEELINGS OF UNSTEADINESS: 0
DEPRESSION: 0
LOSS OF SENSATION IN FEET: 0

## 2024-09-18 ASSESSMENT — COLUMBIA-SUICIDE SEVERITY RATING SCALE - C-SSRS
6. HAVE YOU EVER DONE ANYTHING, STARTED TO DO ANYTHING, OR PREPARED TO DO ANYTHING TO END YOUR LIFE?: NO
2. HAVE YOU ACTUALLY HAD ANY THOUGHTS OF KILLING YOURSELF?: NO
1. IN THE PAST MONTH, HAVE YOU WISHED YOU WERE DEAD OR WISHED YOU COULD GO TO SLEEP AND NOT WAKE UP?: NO

## 2024-09-18 ASSESSMENT — PAIN SCALES - GENERAL: PAINLEVEL: 0-NO PAIN

## 2025-02-05 ENCOUNTER — APPOINTMENT (OUTPATIENT)
Dept: PRIMARY CARE | Facility: CLINIC | Age: 75
End: 2025-02-05
Payer: MEDICARE

## 2025-02-05 VITALS
HEIGHT: 63 IN | WEIGHT: 182 LBS | DIASTOLIC BLOOD PRESSURE: 90 MMHG | HEART RATE: 78 BPM | TEMPERATURE: 98 F | SYSTOLIC BLOOD PRESSURE: 166 MMHG | BODY MASS INDEX: 32.25 KG/M2

## 2025-02-05 DIAGNOSIS — L71.9 ROSACEA: ICD-10-CM

## 2025-02-05 DIAGNOSIS — E83.110 HEREDITARY HEMOCHROMATOSIS (CMS-HCC): ICD-10-CM

## 2025-02-05 DIAGNOSIS — Z00.00 WELLNESS EXAMINATION: ICD-10-CM

## 2025-02-05 DIAGNOSIS — E66.9 OBESITY (BMI 30-39.9): ICD-10-CM

## 2025-02-05 DIAGNOSIS — I10 HTN (HYPERTENSION), BENIGN: ICD-10-CM

## 2025-02-05 DIAGNOSIS — I73.00 RAYNAUD'S DISEASE WITHOUT GANGRENE: ICD-10-CM

## 2025-02-05 DIAGNOSIS — E78.2 MIXED HYPERLIPIDEMIA: ICD-10-CM

## 2025-02-05 DIAGNOSIS — I10 ESSENTIAL HYPERTENSION: Primary | ICD-10-CM

## 2025-02-05 PROCEDURE — 99214 OFFICE O/P EST MOD 30 MIN: CPT | Performed by: FAMILY MEDICINE

## 2025-02-05 PROCEDURE — 3077F SYST BP >= 140 MM HG: CPT | Performed by: FAMILY MEDICINE

## 2025-02-05 PROCEDURE — 1159F MED LIST DOCD IN RCRD: CPT | Performed by: FAMILY MEDICINE

## 2025-02-05 PROCEDURE — G2211 COMPLEX E/M VISIT ADD ON: HCPCS | Performed by: FAMILY MEDICINE

## 2025-02-05 PROCEDURE — 3080F DIAST BP >= 90 MM HG: CPT | Performed by: FAMILY MEDICINE

## 2025-02-05 PROCEDURE — 1123F ACP DISCUSS/DSCN MKR DOCD: CPT | Performed by: FAMILY MEDICINE

## 2025-02-05 RX ORDER — TRIAMTERENE AND HYDROCHLOROTHIAZIDE 37.5; 25 MG/1; MG/1
1 CAPSULE ORAL DAILY
Qty: 90 CAPSULE | Refills: 1 | Status: SHIPPED | OUTPATIENT
Start: 2025-02-05

## 2025-02-05 RX ORDER — PRAVASTATIN SODIUM 80 MG/1
80 TABLET ORAL DAILY
Qty: 90 TABLET | Refills: 1 | Status: SHIPPED | OUTPATIENT
Start: 2025-02-05

## 2025-02-05 RX ORDER — AMLODIPINE BESYLATE 2.5 MG/1
2.5 TABLET ORAL DAILY
Qty: 90 TABLET | Refills: 1 | Status: SHIPPED | OUTPATIENT
Start: 2025-02-05

## 2025-02-05 ASSESSMENT — PATIENT HEALTH QUESTIONNAIRE - PHQ9
2. FEELING DOWN, DEPRESSED OR HOPELESS: NOT AT ALL
1. LITTLE INTEREST OR PLEASURE IN DOING THINGS: NOT AT ALL
SUM OF ALL RESPONSES TO PHQ9 QUESTIONS 1 AND 2: 0

## 2025-02-05 NOTE — PROGRESS NOTES
Patient is here 6-month follow-up.  She been doing well and is very excited because her kids are moving back home from [place] this will give her great help with her  who she been taking care of since 1990 and his Worker's Comp. injury.    REVIEW OF SYSTEMS: 12 systems negative except for those mentioned in the HPI.    PHYSICAL EXAMINATION:   Constitutional: The patient is alert, in no acute  distress.  Eyes: Extraocular movements are intact. Pupils are equal and reactive to light  ENT: External TMs are clear  Neck: Supple without lymphadenopathy or JVD.  Heart: Regular rate and rhythm without murmur, click, gallop, or rub.  Lungs: Clear to auscultation bilaterally. No rales, rhonchi, or  wheezing.  Psychiatric: Good judgment and insight. Normal affect and mood.  Lymphatic: as noted above.  Skin: no rashes or lesions    Assessment:  per EMR    Plan:  Blood pressure did improve after recheck.  Blood pressure medication is refilled as well as cholesterol medication.  Also CBC CMP lipid panel thyroid will follow-up in 6 months for annual wellness.  Up-to-date with colonoscopy with Cologuard she is going back to dermatology for removal of area on her chest    This dictation was created using Dragon dictation and may contain errors

## 2025-02-19 LAB
ALBUMIN SERPL-MCNC: 4.5 G/DL (ref 3.6–5.1)
ALP SERPL-CCNC: 72 U/L (ref 37–153)
ALT SERPL-CCNC: 16 U/L (ref 6–29)
ANION GAP SERPL CALCULATED.4IONS-SCNC: 9 MMOL/L (CALC) (ref 7–17)
AST SERPL-CCNC: 17 U/L (ref 10–35)
BASOPHILS # BLD AUTO: 54 CELLS/UL (ref 0–200)
BASOPHILS NFR BLD AUTO: 0.7 %
BILIRUB SERPL-MCNC: 0.6 MG/DL (ref 0.2–1.2)
BUN SERPL-MCNC: 23 MG/DL (ref 7–25)
CALCIUM SERPL-MCNC: 10.6 MG/DL (ref 8.6–10.4)
CHLORIDE SERPL-SCNC: 101 MMOL/L (ref 98–110)
CHOLEST SERPL-MCNC: 238 MG/DL
CHOLEST/HDLC SERPL: 3.1 (CALC)
CO2 SERPL-SCNC: 29 MMOL/L (ref 20–32)
CREAT SERPL-MCNC: 0.85 MG/DL (ref 0.6–1)
EGFRCR SERPLBLD CKD-EPI 2021: 71 ML/MIN/1.73M2
EOSINOPHIL # BLD AUTO: 200 CELLS/UL (ref 15–500)
EOSINOPHIL NFR BLD AUTO: 2.6 %
ERYTHROCYTE [DISTWIDTH] IN BLOOD BY AUTOMATED COUNT: 13.1 % (ref 11–15)
FERRITIN SERPL-MCNC: 87 NG/ML (ref 16–288)
GLUCOSE SERPL-MCNC: 75 MG/DL (ref 65–99)
HCT VFR BLD AUTO: 50.3 % (ref 35–45)
HDLC SERPL-MCNC: 76 MG/DL
HGB BLD-MCNC: 16.3 G/DL (ref 11.7–15.5)
IRON SATN MFR SERPL: 26 % (CALC) (ref 16–45)
IRON SERPL-MCNC: 82 MCG/DL (ref 45–160)
LDLC SERPL CALC-MCNC: 127 MG/DL (CALC)
LYMPHOCYTES # BLD AUTO: 2426 CELLS/UL (ref 850–3900)
LYMPHOCYTES NFR BLD AUTO: 31.5 %
MCH RBC QN AUTO: 29.1 PG (ref 27–33)
MCHC RBC AUTO-ENTMCNC: 32.4 G/DL (ref 32–36)
MCV RBC AUTO: 89.7 FL (ref 80–100)
MONOCYTES # BLD AUTO: 501 CELLS/UL (ref 200–950)
MONOCYTES NFR BLD AUTO: 6.5 %
NEUTROPHILS # BLD AUTO: 4520 CELLS/UL (ref 1500–7800)
NEUTROPHILS NFR BLD AUTO: 58.7 %
NONHDLC SERPL-MCNC: 162 MG/DL (CALC)
PLATELET # BLD AUTO: 282 THOUSAND/UL (ref 140–400)
PMV BLD REES-ECKER: 10.8 FL (ref 7.5–12.5)
POTASSIUM SERPL-SCNC: 3.9 MMOL/L (ref 3.5–5.3)
PROT SERPL-MCNC: 7.1 G/DL (ref 6.1–8.1)
RBC # BLD AUTO: 5.61 MILLION/UL (ref 3.8–5.1)
SODIUM SERPL-SCNC: 139 MMOL/L (ref 135–146)
TIBC SERPL-MCNC: 314 MCG/DL (CALC) (ref 250–450)
TRIGL SERPL-MCNC: 212 MG/DL
TSH SERPL-ACNC: 2.71 MIU/L (ref 0.4–4.5)
WBC # BLD AUTO: 7.7 THOUSAND/UL (ref 3.8–10.8)

## 2025-04-02 PROCEDURE — 83540 ASSAY OF IRON: CPT

## 2025-04-02 PROCEDURE — 83550 IRON BINDING TEST: CPT

## 2025-04-02 PROCEDURE — 80053 COMPREHEN METABOLIC PANEL: CPT

## 2025-04-02 PROCEDURE — 85025 COMPLETE CBC W/AUTO DIFF WBC: CPT

## 2025-04-02 PROCEDURE — 82728 ASSAY OF FERRITIN: CPT

## 2025-04-03 ENCOUNTER — LAB (OUTPATIENT)
Dept: LAB | Facility: HOSPITAL | Age: 75
End: 2025-04-03
Payer: MEDICARE

## 2025-04-03 DIAGNOSIS — E83.110 HEREDITARY HEMOCHROMATOSIS (CMS-HCC): Primary | ICD-10-CM

## 2025-04-03 LAB
ALBUMIN SERPL BCP-MCNC: 4.4 G/DL (ref 3.4–5)
ALP SERPL-CCNC: 76 U/L (ref 33–136)
ALT SERPL W P-5'-P-CCNC: 17 U/L (ref 7–45)
ANION GAP SERPL CALC-SCNC: 9 MMOL/L (ref 10–20)
AST SERPL W P-5'-P-CCNC: 16 U/L (ref 9–39)
BASOPHILS # BLD AUTO: 0.03 X10*3/UL (ref 0–0.1)
BASOPHILS NFR BLD AUTO: 0.4 %
BILIRUB SERPL-MCNC: 0.5 MG/DL (ref 0–1.2)
BUN SERPL-MCNC: 20 MG/DL (ref 6–23)
CALCIUM SERPL-MCNC: 11.3 MG/DL (ref 8.6–10.6)
CHLORIDE SERPL-SCNC: 102 MMOL/L (ref 98–107)
CO2 SERPL-SCNC: 33 MMOL/L (ref 21–32)
CREAT SERPL-MCNC: 0.72 MG/DL (ref 0.5–1.05)
EGFRCR SERPLBLD CKD-EPI 2021: 87 ML/MIN/1.73M*2
EOSINOPHIL # BLD AUTO: 0.15 X10*3/UL (ref 0–0.4)
EOSINOPHIL NFR BLD AUTO: 2.2 %
ERYTHROCYTE [DISTWIDTH] IN BLOOD BY AUTOMATED COUNT: 13.2 % (ref 11.5–14.5)
FERRITIN SERPL-MCNC: 103 NG/ML (ref 8–150)
GLUCOSE SERPL-MCNC: 82 MG/DL (ref 74–99)
HCT VFR BLD AUTO: 49.9 % (ref 36–46)
HGB BLD-MCNC: 16.1 G/DL (ref 12–16)
HOLD SPECIMEN: NORMAL
IMM GRANULOCYTES # BLD AUTO: 0.03 X10*3/UL (ref 0–0.5)
IMM GRANULOCYTES NFR BLD AUTO: 0.4 % (ref 0–0.9)
IRON SATN MFR SERPL: 25 % (ref 25–45)
IRON SERPL-MCNC: 81 UG/DL (ref 35–150)
LYMPHOCYTES # BLD AUTO: 2.02 X10*3/UL (ref 0.8–3)
LYMPHOCYTES NFR BLD AUTO: 29.8 %
MCH RBC QN AUTO: 28.9 PG (ref 26–34)
MCHC RBC AUTO-ENTMCNC: 32.3 G/DL (ref 32–36)
MCV RBC AUTO: 89 FL (ref 80–100)
MONOCYTES # BLD AUTO: 0.47 X10*3/UL (ref 0.05–0.8)
MONOCYTES NFR BLD AUTO: 6.9 %
NEUTROPHILS # BLD AUTO: 4.07 X10*3/UL (ref 1.6–5.5)
NEUTROPHILS NFR BLD AUTO: 60.3 %
NRBC BLD-RTO: 0 /100 WBCS (ref 0–0)
PLATELET # BLD AUTO: 277 X10*3/UL (ref 150–450)
POTASSIUM SERPL-SCNC: 3.8 MMOL/L (ref 3.5–5.3)
PROT SERPL-MCNC: 7.1 G/DL (ref 6.4–8.2)
RBC # BLD AUTO: 5.58 X10*6/UL (ref 4–5.2)
SODIUM SERPL-SCNC: 140 MMOL/L (ref 136–145)
TIBC SERPL-MCNC: 326 UG/DL (ref 240–445)
UIBC SERPL-MCNC: 245 UG/DL (ref 110–370)
WBC # BLD AUTO: 6.8 X10*3/UL (ref 4.4–11.3)

## 2025-04-04 ENCOUNTER — OFFICE VISIT (OUTPATIENT)
Dept: HEMATOLOGY/ONCOLOGY | Facility: CLINIC | Age: 75
End: 2025-04-04
Payer: MEDICARE

## 2025-04-04 VITALS
OXYGEN SATURATION: 93 % | TEMPERATURE: 98.4 F | RESPIRATION RATE: 16 BRPM | SYSTOLIC BLOOD PRESSURE: 158 MMHG | BODY MASS INDEX: 31.95 KG/M2 | HEART RATE: 79 BPM | WEIGHT: 180.34 LBS | DIASTOLIC BLOOD PRESSURE: 91 MMHG

## 2025-04-04 DIAGNOSIS — E83.110 HEREDITARY HEMOCHROMATOSIS (CMS-HCC): ICD-10-CM

## 2025-04-04 DIAGNOSIS — J30.1 SEASONAL ALLERGIC RHINITIS DUE TO POLLEN: ICD-10-CM

## 2025-04-04 DIAGNOSIS — I10 ESSENTIAL HYPERTENSION: ICD-10-CM

## 2025-04-04 DIAGNOSIS — E78.2 MIXED HYPERLIPIDEMIA: ICD-10-CM

## 2025-04-04 PROCEDURE — 1160F RVW MEDS BY RX/DR IN RCRD: CPT | Performed by: INTERNAL MEDICINE

## 2025-04-04 PROCEDURE — G2211 COMPLEX E/M VISIT ADD ON: HCPCS | Performed by: INTERNAL MEDICINE

## 2025-04-04 PROCEDURE — 99214 OFFICE O/P EST MOD 30 MIN: CPT | Performed by: INTERNAL MEDICINE

## 2025-04-04 PROCEDURE — 3080F DIAST BP >= 90 MM HG: CPT | Performed by: INTERNAL MEDICINE

## 2025-04-04 PROCEDURE — 1123F ACP DISCUSS/DSCN MKR DOCD: CPT | Performed by: INTERNAL MEDICINE

## 2025-04-04 PROCEDURE — 1126F AMNT PAIN NOTED NONE PRSNT: CPT | Performed by: INTERNAL MEDICINE

## 2025-04-04 PROCEDURE — 3077F SYST BP >= 140 MM HG: CPT | Performed by: INTERNAL MEDICINE

## 2025-04-04 PROCEDURE — 1159F MED LIST DOCD IN RCRD: CPT | Performed by: INTERNAL MEDICINE

## 2025-04-04 ASSESSMENT — PAIN SCALES - GENERAL: PAINLEVEL_OUTOF10: 0-NO PAIN

## 2025-04-04 NOTE — PROGRESS NOTES
Patient ID: Estela Stinson is a 74 y.o. female.  Referring Physician: Schuyler Muller MD  [id number] [facility] [address]  [place] [address]  Primary Care Provider: Anish Barnes DO  Visit Type: {Visit Type:94875}      Subjective    HPI    Review of Systems - Oncology     Objective   BSA: 1.91 meters squared  BP (!) 158/91 (BP Location: Left arm, Patient Position: Sitting, BP Cuff Size: Adult) Comment: JUST TOOK B/P MEDS. KALEB AWARE  Pulse 79   Temp 36.9 °C (98.4 °F) (Temporal)   Resp 16   Wt 81.8 kg (180 lb 5.4 oz) Comment: NO SHOES,COAT  SpO2 93%   BMI 31.95 kg/m²      has a past medical history of Bronchitis, History of rosacea, Hypertension, Lung nodules, and Raynaud's disease.   has a past surgical history that includes Other surgical history (01/25/2022) and Other surgical history (08/08/2022).  Family History   Problem Relation Name Age of Onset    Other (nephrocarcinoma) Sister       Oncology History    No history exists.       Estela Stinson  reports that she has never smoked. She has never used smokeless tobacco.  She  reports current alcohol use of about 1.0 standard drink of alcohol per week.  She  reports no history of drug use.    Physical Exam    WBC   Date/Time Value Ref Range Status   04/02/2025 12:43 PM 6.8 4.4 - 11.3 x10*3/uL Final   04/03/2024 08:34 AM 5.2 4.4 - 11.3 x10*3/uL Final   03/31/2023 08:48 AM 6.5 4.4 - 11.3 x10E9/L Final   03/13/2023 09:30 AM 6.1 4.4 - 11.3 x10E9/L Final   03/15/2022 12:25 PM 7.8 4.4 - 11.3 x10E9/L Final     WHITE BLOOD CELL COUNT   Date/Time Value Ref Range Status   02/18/2025 12:44 PM 7.7 3.8 - 10.8 Thousand/uL Final     nRBC   Date Value Ref Range Status   04/02/2025 0.0 0.0 - 0.0 /100 WBCs Final   03/13/2023 0.0 0.0 - 0.0 /100 WBC Final     RBC   Date Value Ref Range Status   04/02/2025 5.58 (H) 4.00 - 5.20 x10*6/uL Final   04/03/2024 5.27 (H) 4.00 - 5.20 x10*6/uL Final   03/31/2023 5.31 (H) 4.00 - 5.20 x10E12/L Final   03/13/2023 5.22 (H) 4.00 -  5.20 x10E12/L Final   03/15/2022 5.38 (H) 4.00 - 5.20 x10E12/L Final     RED BLOOD CELL COUNT   Date Value Ref Range Status   02/18/2025 5.61 (H) 3.80 - 5.10 Million/uL Final     Hemoglobin   Date Value Ref Range Status   04/02/2025 16.1 (H) 12.0 - 16.0 g/dL Final   04/03/2024 15.4 12.0 - 16.0 g/dL Final   03/31/2023 15.5 12.0 - 16.0 g/dL Final   03/13/2023 15.1 12.0 - 16.0 g/dL Final   03/15/2022 15.7 12.0 - 16.0 g/dL Final     HEMOGLOBIN   Date Value Ref Range Status   02/18/2025 16.3 (H) 11.7 - 15.5 g/dL Final     Hematocrit   Date Value Ref Range Status   04/02/2025 49.9 (H) 36.0 - 46.0 % Final   04/03/2024 47.3 (H) 36.0 - 46.0 % Final   03/31/2023 47.2 (H) 36.0 - 46.0 % Final   03/13/2023 46.9 (H) 36.0 - 46.0 % Final   03/15/2022 48.3 (H) 36.0 - 46.0 % Final     HEMATOCRIT   Date Value Ref Range Status   02/18/2025 50.3 (H) 35.0 - 45.0 % Final     MCV   Date/Time Value Ref Range Status   04/02/2025 12:43 PM 89 80 - 100 fL Final   02/18/2025 12:44 PM 89.7 80.0 - 100.0 fL Final   04/03/2024 08:34 AM 90 80 - 100 fL Final   03/31/2023 08:48 AM 89 80 - 100 fL Final   03/13/2023 09:30 AM 90 80 - 100 fL Final   03/15/2022 12:25 PM 90 80 - 100 fL Final     MCH   Date/Time Value Ref Range Status   04/02/2025 12:43 PM 28.9 26.0 - 34.0 pg Final   02/18/2025 12:44 PM 29.1 27.0 - 33.0 pg Final   04/03/2024 08:34 AM 29.2 26.0 - 34.0 pg Final     MCHC   Date/Time Value Ref Range Status   04/02/2025 12:43 PM 32.3 32.0 - 36.0 g/dL Final   02/18/2025 12:44 PM 32.4 32.0 - 36.0 g/dL Final     Comment:     For adults, a slight decrease in the calculated MCHC  value (in the range of 30 to 32 g/dL) is most likely  not clinically significant; however, it should be  interpreted with caution in correlation with other  red cell parameters and the patient's clinical  condition.     04/03/2024 08:34 AM 32.6 32.0 - 36.0 g/dL Final   03/31/2023 08:48 AM 32.8 32.0 - 36.0 g/dL Final   03/13/2023 09:30 AM 32.2 32.0 - 36.0 g/dL Final    03/15/2022 12:25 PM 32.5 32.0 - 36.0 g/dL Final     RDW   Date/Time Value Ref Range Status   04/02/2025 12:43 PM 13.2 11.5 - 14.5 % Final   02/18/2025 12:44 PM 13.1 11.0 - 15.0 % Final   04/03/2024 08:34 AM 12.8 11.5 - 14.5 % Final   03/31/2023 08:48 AM 12.8 11.5 - 14.5 % Final   03/13/2023 09:30 AM 12.7 11.5 - 14.5 % Final   03/15/2022 12:25 PM 12.7 11.5 - 14.5 % Final     Platelets   Date/Time Value Ref Range Status   04/02/2025 12:43  150 - 450 x10*3/uL Final   04/03/2024 08:34  150 - 450 x10*3/uL Final   03/31/2023 08:48  150 - 450 x10E9/L Final   03/13/2023 09:30  150 - 450 x10E9/L Final   03/15/2022 12:25  150 - 450 x10E9/L Final     PLATELET COUNT   Date/Time Value Ref Range Status   02/18/2025 12:44  140 - 400 Thousand/uL Final     MPV   Date/Time Value Ref Range Status   02/18/2025 12:44 PM 10.8 7.5 - 12.5 fL Final     Neutrophils %   Date/Time Value Ref Range Status   04/02/2025 12:43 PM 60.3 40.0 - 80.0 % Final   04/03/2024 08:34 AM 53.8 40.0 - 80.0 % Final   03/31/2023 08:48 AM 53.4 40.0 - 80.0 % Final   03/15/2022 12:25 PM 71.3 40.0 - 80.0 % Final     Immature Granulocytes %, Automated   Date/Time Value Ref Range Status   04/02/2025 12:43 PM 0.4 0.0 - 0.9 % Final     Comment:     Immature Granulocyte Count (IG) includes promyelocytes, myelocytes and metamyelocytes but does not include bands. Percent differential counts (%) should be interpreted in the context of the absolute cell counts (cells/UL).   04/03/2024 08:34 AM 0.2 0.0 - 0.9 % Final     Comment:     Immature Granulocyte Count (IG) includes promyelocytes, myelocytes and metamyelocytes but does not include bands. Percent differential counts (%) should be interpreted in the context of the absolute cell counts (cells/UL).   03/31/2023 08:48 AM 0.0 0.0 - 0.9 % Final     Comment:      Immature Granulocyte Count (IG) includes promyelocytes,    myelocytes and metamyelocytes but does not include bands.   Percent  differential counts (%) should be interpreted in the   context of the absolute cell counts (cells/L).     03/15/2022 12:25 PM 0.3 0.0 - 0.9 % Final     Comment:      Immature Granulocyte Count (IG) includes promyelocytes,    myelocytes and metamyelocytes but does not include bands.   Percent differential counts (%) should be interpreted in the   context of the absolute cell counts (cells/L).       Lymphocytes %   Date/Time Value Ref Range Status   04/02/2025 12:43 PM 29.8 13.0 - 44.0 % Final   04/03/2024 08:34 AM 36.0 13.0 - 44.0 % Final   03/31/2023 08:48 AM 37.3 13.0 - 44.0 % Final   03/15/2022 12:25 PM 20.7 13.0 - 44.0 % Final     LYMPHOCYTES   Date/Time Value Ref Range Status   02/18/2025 12:44 PM 31.5 % Final     Monocytes %   Date/Time Value Ref Range Status   04/02/2025 12:43 PM 6.9 2.0 - 10.0 % Final   04/03/2024 08:34 AM 6.5 2.0 - 10.0 % Final   03/31/2023 08:48 AM 5.9 2.0 - 10.0 % Final   03/15/2022 12:25 PM 6.2 2.0 - 10.0 % Final     MONOCYTES   Date/Time Value Ref Range Status   02/18/2025 12:44 PM 6.5 % Final     Eosinophils %   Date/Time Value Ref Range Status   04/02/2025 12:43 PM 2.2 0.0 - 6.0 % Final   04/03/2024 08:34 AM 2.9 0.0 - 6.0 % Final   03/31/2023 08:48 AM 2.9 0.0 - 6.0 % Final   03/15/2022 12:25 PM 1.0 0.0 - 6.0 % Final     EOSINOPHILS   Date/Time Value Ref Range Status   02/18/2025 12:44 PM 2.6 % Final     Basophils %   Date/Time Value Ref Range Status   04/02/2025 12:43 PM 0.4 0.0 - 2.0 % Final   04/03/2024 08:34 AM 0.6 0.0 - 2.0 % Final   03/31/2023 08:48 AM 0.5 0.0 - 2.0 % Final   03/15/2022 12:25 PM 0.5 0.0 - 2.0 % Final     BASOPHILS   Date/Time Value Ref Range Status   02/18/2025 12:44 PM 0.7 % Final     Neutrophils Absolute   Date/Time Value Ref Range Status   04/02/2025 12:43 PM 4.07 1.60 - 5.50 x10*3/uL Final     Comment:     Percent differential counts (%) should be interpreted in the context of the absolute cell counts (cells/uL).   04/03/2024 08:34 AM 2.81 1.60 - 5.50 x10*3/uL  "Final     Comment:     Percent differential counts (%) should be interpreted in the context of the absolute cell counts (cells/uL).   03/31/2023 08:48 AM 3.45 1.60 - 5.50 x10E9/L Final   03/15/2022 12:25 PM 5.56 (H) 1.60 - 5.50 x10E9/L Final     Immature Granulocytes Absolute, Automated   Date/Time Value Ref Range Status   04/02/2025 12:43 PM 0.03 0.00 - 0.50 x10*3/uL Final   04/03/2024 08:34 AM 0.01 0.00 - 0.50 x10*3/uL Final     Lymphocytes Absolute   Date/Time Value Ref Range Status   04/02/2025 12:43 PM 2.02 0.80 - 3.00 x10*3/uL Final   04/03/2024 08:34 AM 1.88 0.80 - 3.00 x10*3/uL Final   03/31/2023 08:48 AM 2.41 0.80 - 3.00 x10E9/L Final   03/15/2022 12:25 PM 1.61 0.80 - 3.00 x10E9/L Final     Monocytes Absolute   Date/Time Value Ref Range Status   04/02/2025 12:43 PM 0.47 0.05 - 0.80 x10*3/uL Final   04/03/2024 08:34 AM 0.34 0.05 - 0.80 x10*3/uL Final   03/31/2023 08:48 AM 0.38 0.05 - 0.80 x10E9/L Final   03/15/2022 12:25 PM 0.48 0.05 - 0.80 x10E9/L Final     Eosinophils Absolute   Date/Time Value Ref Range Status   04/02/2025 12:43 PM 0.15 0.00 - 0.40 x10*3/uL Final   04/03/2024 08:34 AM 0.15 0.00 - 0.40 x10*3/uL Final   03/31/2023 08:48 AM 0.19 0.00 - 0.40 x10E9/L Final   03/15/2022 12:25 PM 0.08 0.00 - 0.40 x10E9/L Final     ABSOLUTE EOSINOPHILS   Date/Time Value Ref Range Status   02/18/2025 12:44  15 - 500 cells/uL Final     Basophils Absolute   Date/Time Value Ref Range Status   04/02/2025 12:43 PM 0.03 0.00 - 0.10 x10*3/uL Final   04/03/2024 08:34 AM 0.03 0.00 - 0.10 x10*3/uL Final   03/31/2023 08:48 AM 0.03 0.00 - 0.10 x10E9/L Final   03/15/2022 12:25 PM 0.04 0.00 - 0.10 x10E9/L Final     ABSOLUTE BASOPHILS   Date/Time Value Ref Range Status   02/18/2025 12:44 PM 54 0 - 200 cells/uL Final       No components found for: \"PT\"  No results found for: \"APTT\"    Assessment/Plan         {Assess/PlanSmartLinks:83722}         Schuyler Muller MD                         " "08:48 AM 2.41 0.80 - 3.00 x10E9/L Final   03/15/2022 12:25 PM 1.61 0.80 - 3.00 x10E9/L Final     Monocytes Absolute   Date/Time Value Ref Range Status   04/02/2025 12:43 PM 0.47 0.05 - 0.80 x10*3/uL Final   04/03/2024 08:34 AM 0.34 0.05 - 0.80 x10*3/uL Final   03/31/2023 08:48 AM 0.38 0.05 - 0.80 x10E9/L Final   03/15/2022 12:25 PM 0.48 0.05 - 0.80 x10E9/L Final     Eosinophils Absolute   Date/Time Value Ref Range Status   04/02/2025 12:43 PM 0.15 0.00 - 0.40 x10*3/uL Final   04/03/2024 08:34 AM 0.15 0.00 - 0.40 x10*3/uL Final   03/31/2023 08:48 AM 0.19 0.00 - 0.40 x10E9/L Final   03/15/2022 12:25 PM 0.08 0.00 - 0.40 x10E9/L Final     ABSOLUTE EOSINOPHILS   Date/Time Value Ref Range Status   02/18/2025 12:44  15 - 500 cells/uL Final     Basophils Absolute   Date/Time Value Ref Range Status   04/02/2025 12:43 PM 0.03 0.00 - 0.10 x10*3/uL Final   04/03/2024 08:34 AM 0.03 0.00 - 0.10 x10*3/uL Final   03/31/2023 08:48 AM 0.03 0.00 - 0.10 x10E9/L Final   03/15/2022 12:25 PM 0.04 0.00 - 0.10 x10E9/L Final     ABSOLUTE BASOPHILS   Date/Time Value Ref Range Status   02/18/2025 12:44 PM 54 0 - 200 cells/uL Final       No components found for: \"PT\"  No results found for: \"APTT\"  Medication Documentation Review Audit       Reviewed by Winsome Cruz MA (Medical Assistant) on 04/04/25 at 1009      Medication Order Taking? Sig Documenting Provider Last Dose Status   albuterol 90 mcg/actuation inhaler [id number] Yes INHALE ONE PUFF BY MOUTH EVERY 4 HOURS AS NEEDED FOR COUGH / WHEEZE. Historical Provider, MD Taking Active   amLODIPine (Norvasc) 2.5 mg tablet [id number] Yes Take 1 tablet (2.5 mg) by mouth once daily. Anish Barnes, DO  Active   fluticasone (Flonase) 50 mcg/actuation nasal spray [id number] Yes Administer 2 sprays into each nostril once daily.   Patient taking differently: Administer 2 sprays into each nostril once daily. PRN    Anish Barnes, DO Taking Active   pravastatin (Pravachol) 80 mg tablet [id number] Yes " Take 1 tablet (80 mg) by mouth once daily. Anish Barnes, DO  Active   triamterene-hydrochlorothiazid (Dyazide) 37.5-25 mg capsule [id number] Yes Take 1 capsule by mouth once daily. 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  -labs done on 4/4/2025included CBC, COMP, iron panel + ferritin  -results reviewed--wbc 6.8, hgb 16.1, plt 277,000, creatinine 0.72, alk phos 76, AST 16, total bili 0.5, ALT 17, TIBC 326, ferritin 103, sat 25%  -reviewed criteria for initiation of therapeutic phlebotomy--ferritin >500, sat >60%  -as her numbers have not reached threshold, will continue to follow annually  -she is doing well, has no complaints     2) hyperlipidemia  -on pravastatin     3) hypertension  -on dyazide  -on amlodipine     4) seasonal allergies  -on flonase  -on afrin  -on albuterol     Problem List Items Addressed This Visit             ICD-10-CM    Hereditary hemochromatosis (CMS-HCC) E83.110    Relevant Orders    Clinic Appointment Request Follow Up; SCHUYLER MULLER; [id number]    CBC and Auto Differential    Iron and TIBC    Comprehensive metabolic panel    Lactate dehydrogenase            Schuyler Muller MD

## 2025-04-07 ASSESSMENT — ENCOUNTER SYMPTOMS
MUSCULOSKELETAL NEGATIVE: 1
EYES NEGATIVE: 1
ENDOCRINE NEGATIVE: 1
HEMATOLOGIC/LYMPHATIC NEGATIVE: 1
RESPIRATORY NEGATIVE: 1
PSYCHIATRIC NEGATIVE: 1
CONSTITUTIONAL NEGATIVE: 1
GASTROINTESTINAL NEGATIVE: 1
NEUROLOGICAL NEGATIVE: 1
CARDIOVASCULAR NEGATIVE: 1

## 2025-05-02 ENCOUNTER — OFFICE VISIT (OUTPATIENT)
Dept: PRIMARY CARE | Facility: CLINIC | Age: 75
End: 2025-05-02
Payer: MEDICARE

## 2025-05-02 VITALS
TEMPERATURE: 97.1 F | BODY MASS INDEX: 31.89 KG/M2 | HEIGHT: 63 IN | DIASTOLIC BLOOD PRESSURE: 80 MMHG | SYSTOLIC BLOOD PRESSURE: 158 MMHG | HEART RATE: 81 BPM | WEIGHT: 180 LBS

## 2025-05-02 DIAGNOSIS — E66.9 OBESITY (BMI 30-39.9): ICD-10-CM

## 2025-05-02 DIAGNOSIS — S61.217A LACERATION OF LEFT LITTLE FINGER WITHOUT FOREIGN BODY WITHOUT DAMAGE TO NAIL, INITIAL ENCOUNTER: Primary | ICD-10-CM

## 2025-05-02 PROCEDURE — G2211 COMPLEX E/M VISIT ADD ON: HCPCS | Performed by: FAMILY MEDICINE

## 2025-05-02 PROCEDURE — 1123F ACP DISCUSS/DSCN MKR DOCD: CPT | Performed by: FAMILY MEDICINE

## 2025-05-02 PROCEDURE — 1036F TOBACCO NON-USER: CPT | Performed by: FAMILY MEDICINE

## 2025-05-02 PROCEDURE — 1158F ADVNC CARE PLAN TLK DOCD: CPT | Performed by: FAMILY MEDICINE

## 2025-05-02 PROCEDURE — 3077F SYST BP >= 140 MM HG: CPT | Performed by: FAMILY MEDICINE

## 2025-05-02 PROCEDURE — 99213 OFFICE O/P EST LOW 20 MIN: CPT | Performed by: FAMILY MEDICINE

## 2025-05-02 PROCEDURE — 3079F DIAST BP 80-89 MM HG: CPT | Performed by: FAMILY MEDICINE

## 2025-05-02 PROCEDURE — 1159F MED LIST DOCD IN RCRD: CPT | Performed by: FAMILY MEDICINE

## 2025-05-02 RX ORDER — AMOXICILLIN AND CLAVULANATE POTASSIUM 875; 125 MG/1; MG/1
875 TABLET, FILM COATED ORAL 2 TIMES DAILY
Qty: 14 TABLET | Refills: 0 | Status: SHIPPED | OUTPATIENT
Start: 2025-05-02 | End: 2025-05-09

## 2025-05-02 ASSESSMENT — PATIENT HEALTH QUESTIONNAIRE - PHQ9
SUM OF ALL RESPONSES TO PHQ9 QUESTIONS 1 AND 2: 0
2. FEELING DOWN, DEPRESSED OR HOPELESS: NOT AT ALL
1. LITTLE INTEREST OR PLEASURE IN DOING THINGS: NOT AT ALL

## 2025-05-02 NOTE — PROGRESS NOTES
Patient is here he had fallen and ended up with a laceration on her right pinky.  Put in 7 days ago.  Started to get different color yesterday.    REVIEW OF SYSTEMS: 12 systems negative except for those mentioned in the HPI.    PHYSICAL EXAMINATION:   Constitutional: The patient is alert, in no acute  distress.  Eyes: Extraocular movements are intact.   ENT: external ear canals patent  Neck: no  JVD.  Heart: no JVD  Lungs: Normal respiration without stridor or nasal flaring   Psychiatric: Good judgment and insight. Normal affect and mood.  Skin: On the palmar aspect of the little finger at the distal crease there is 2 below nylon stitches however there is some erythema mild edema    Assessment:  per EMR    Plan:  Discussed at this point concern for infection as well as also since it is in the joint flexion would like to keep in for at least 10 days if not potentially 12.  Will start on Augmentin will come back on Monday blood pressure well-controlled    This dictation was created using Dragon dictation and may contain errors

## 2025-05-05 ENCOUNTER — APPOINTMENT (OUTPATIENT)
Dept: PRIMARY CARE | Facility: CLINIC | Age: 75
End: 2025-05-05
Payer: MEDICARE

## 2025-05-05 VITALS
SYSTOLIC BLOOD PRESSURE: 161 MMHG | HEART RATE: 71 BPM | BODY MASS INDEX: 31.71 KG/M2 | HEIGHT: 63 IN | DIASTOLIC BLOOD PRESSURE: 91 MMHG | TEMPERATURE: 97 F | WEIGHT: 179 LBS

## 2025-05-05 DIAGNOSIS — S61.217D LACERATION OF LEFT LITTLE FINGER WITHOUT FOREIGN BODY WITHOUT DAMAGE TO NAIL, SUBSEQUENT ENCOUNTER: Primary | ICD-10-CM

## 2025-05-05 DIAGNOSIS — E66.9 OBESITY (BMI 30-39.9): ICD-10-CM

## 2025-05-05 PROCEDURE — 3077F SYST BP >= 140 MM HG: CPT | Performed by: FAMILY MEDICINE

## 2025-05-05 PROCEDURE — G2211 COMPLEX E/M VISIT ADD ON: HCPCS | Performed by: FAMILY MEDICINE

## 2025-05-05 PROCEDURE — 99212 OFFICE O/P EST SF 10 MIN: CPT | Performed by: FAMILY MEDICINE

## 2025-05-05 PROCEDURE — 1036F TOBACCO NON-USER: CPT | Performed by: FAMILY MEDICINE

## 2025-05-05 PROCEDURE — 1123F ACP DISCUSS/DSCN MKR DOCD: CPT | Performed by: FAMILY MEDICINE

## 2025-05-05 PROCEDURE — 1159F MED LIST DOCD IN RCRD: CPT | Performed by: FAMILY MEDICINE

## 2025-05-05 PROCEDURE — 3080F DIAST BP >= 90 MM HG: CPT | Performed by: FAMILY MEDICINE

## 2025-05-05 NOTE — PROGRESS NOTES
July 28  Dx abd wall mass, suspect endomertiosis  Procedure, excision of mass  vstrick assist branden  ebl min  2gms ancef  No complications  Pathology, mass, endometriosis, scar tissue          The patient was placed under general anesthesia antibiotics were given and she was doubly prepped in supine.  The mass was just above and lateral to her left previous incision and therefore a 4 cm incision was made over the lower area of the mass.  Sharp and blunt dissection were used including electrocautery whereby we encompass the very hard irregular area.  This was typical of endometriotic tissue with dark blood and firm scarring.  THe mass was irregular in shape and approximately 4cm x 4cm. Several areas were excised and submitted for pathology.  The lesions extended down to the fascia and we were able to trim the areas off.  Pinpoint cautery was used for hemostasis the operative field was irrigated copiously and then closed with plain gut suture in a subcutaneous tissue and 3-0 Monocryl on the skin.  Blood loss was less than 50 cc there were no complications.  The patient was taken to recovery in good condition.   Patient is here for the above.  Doing much better with the Augmentin.    REVIEW OF SYSTEMS: 12 systems negative except for those mentioned in the HPI.    PHYSICAL EXAMINATION:   Constitutional: The patient is alert, in no acute  distress.  Eyes: Extraocular movements are intact.   ENT: external ear canals patent  Neck: no  JVD.  Heart: no JVD  Lungs: Normal respiration without stridor or nasal flaring   Psychiatric: Good judgment and insight. Normal affect and mood.  Skin: Fifth digit of the left finger the dorsum much better with only mild erythema well-approximated wound.  Patient with oral consent for removal.  2 nylon sutures removed Steri-Strip was placed no complications    Assessment:  per EMR    Plan:  Discussed basic wound care continue finish out Augmentin for cellulitis    This dictation was created using Dragon dictation and may contain errors

## 2025-06-13 NOTE — PROGRESS NOTES
"Subjective   Estela Stinson  is a 74 y.o. female who presents for evaluation of multiple lung nodules - Largest is a 9 mm RLL.      This patient received a calcium scoring CT scan on 3/1/2023 which incidentally detected multiple subcentimeter lung nodules.  This was felt to be related to \"remote granulomatous disease\".  She received a follow-up CT scan on 8/20/2024 which showed multiple nodules the largest of which measured 9 mm and was \"similar to prior exam\".  I recommended radiographic surveillance.    Currently the patient is presenting for routine follow-up and in their usual state of health. She denies the following symptoms: chest pain, shortness of breath at rest, shortness of breath with activity, cough, hemoptysis, fevers, chills, and weight loss.      There have been no significant changes to their documented medical, surgical and family history.     She  reports that she has never smoked. She has never used smokeless tobacco. She reports current alcohol use of about 1.0 standard drink of alcohol per week. She reports that she does not use drugs.    Objective   Physical Exam  The patient is well-appearing and in no acute distress. The trachea is midline and there is no crepitus. The lungs were clear to auscultation grossly. There was good effort and excursion. The heart had a regular rate and rhythm. The abdomen was soft, nontender and nondistended. The extremities had no edema or gross deformities. Mood and affect are appropriate.  Diagnostic Studies  I reviewed a CT chest performed recently. I do not see any new or concerning nodules or adenopathy    Assessment/Plan   I believe that the patient is doing well.     Based on the patient's clinical presentation and my review of their radiographic imaging, I believe the lung nodule is unlikely to represent a malignant process.  We discussed various management strategies including surgery, biopsy, and observation.  Based on this discussion, the patient " elected for observational management.  I recommend serial radiographic surveillance.    I recommend CT chest in 12 months    I discussed this in detail with the patient, including a discussion of alternatives. They were comfortable with this approach.     Tray Olivera MD  [phone]

## 2025-06-18 ENCOUNTER — OFFICE VISIT (OUTPATIENT)
Dept: SURGERY | Facility: CLINIC | Age: 75
End: 2025-06-18
Payer: MEDICARE

## 2025-06-18 ENCOUNTER — HOSPITAL ENCOUNTER (OUTPATIENT)
Dept: RADIOLOGY | Facility: CLINIC | Age: 75
Discharge: HOME | End: 2025-06-18
Payer: MEDICARE

## 2025-06-18 VITALS
OXYGEN SATURATION: 95 % | WEIGHT: 179.8 LBS | HEART RATE: 80 BPM | BODY MASS INDEX: 30.7 KG/M2 | HEIGHT: 64 IN | DIASTOLIC BLOOD PRESSURE: 86 MMHG | SYSTOLIC BLOOD PRESSURE: 159 MMHG | TEMPERATURE: 98.1 F

## 2025-06-18 DIAGNOSIS — R91.8 LUNG NODULES: Primary | ICD-10-CM

## 2025-06-18 DIAGNOSIS — R91.1 LUNG NODULE: ICD-10-CM

## 2025-06-18 PROCEDURE — 3079F DIAST BP 80-89 MM HG: CPT | Performed by: THORACIC SURGERY (CARDIOTHORACIC VASCULAR SURGERY)

## 2025-06-18 PROCEDURE — 99215 OFFICE O/P EST HI 40 MIN: CPT | Performed by: THORACIC SURGERY (CARDIOTHORACIC VASCULAR SURGERY)

## 2025-06-18 PROCEDURE — 1159F MED LIST DOCD IN RCRD: CPT | Performed by: THORACIC SURGERY (CARDIOTHORACIC VASCULAR SURGERY)

## 2025-06-18 PROCEDURE — 71250 CT THORAX DX C-: CPT | Performed by: RADIOLOGY

## 2025-06-18 PROCEDURE — 1126F AMNT PAIN NOTED NONE PRSNT: CPT | Performed by: THORACIC SURGERY (CARDIOTHORACIC VASCULAR SURGERY)

## 2025-06-18 PROCEDURE — 99215 OFFICE O/P EST HI 40 MIN: CPT | Mod: 25 | Performed by: THORACIC SURGERY (CARDIOTHORACIC VASCULAR SURGERY)

## 2025-06-18 PROCEDURE — 3077F SYST BP >= 140 MM HG: CPT | Performed by: THORACIC SURGERY (CARDIOTHORACIC VASCULAR SURGERY)

## 2025-06-18 PROCEDURE — 71250 CT THORAX DX C-: CPT

## 2025-06-18 PROCEDURE — 1036F TOBACCO NON-USER: CPT | Performed by: THORACIC SURGERY (CARDIOTHORACIC VASCULAR SURGERY)

## 2025-06-18 ASSESSMENT — PAIN SCALES - GENERAL: PAINLEVEL_OUTOF10: 0-NO PAIN

## 2025-06-18 ASSESSMENT — ENCOUNTER SYMPTOMS
LOSS OF SENSATION IN FEET: 0
DEPRESSION: 0
OCCASIONAL FEELINGS OF UNSTEADINESS: 0

## 2025-06-24 ENCOUNTER — TELEPHONE (OUTPATIENT)
Dept: PRIMARY CARE | Facility: CLINIC | Age: 75
End: 2025-06-24
Payer: MEDICARE

## 2025-06-24 NOTE — TELEPHONE ENCOUNTER
Pt called and said her daughter and her family have moved to the area and are looking for a new doctor, wants to know if they can all be new pts

## 2025-08-06 ENCOUNTER — APPOINTMENT (OUTPATIENT)
Dept: PRIMARY CARE | Facility: CLINIC | Age: 75
End: 2025-08-06
Payer: MEDICARE

## 2025-08-06 VITALS
TEMPERATURE: 97 F | SYSTOLIC BLOOD PRESSURE: 136 MMHG | WEIGHT: 176 LBS | BODY MASS INDEX: 31.18 KG/M2 | HEIGHT: 63 IN | HEART RATE: 87 BPM | DIASTOLIC BLOOD PRESSURE: 88 MMHG

## 2025-08-06 DIAGNOSIS — L71.9 ROSACEA: ICD-10-CM

## 2025-08-06 DIAGNOSIS — I73.00 RAYNAUD'S DISEASE WITHOUT GANGRENE: ICD-10-CM

## 2025-08-06 DIAGNOSIS — E66.9 OBESITY (BMI 30-39.9): ICD-10-CM

## 2025-08-06 DIAGNOSIS — Z00.00 WELLNESS EXAMINATION: ICD-10-CM

## 2025-08-06 DIAGNOSIS — E78.2 MIXED HYPERLIPIDEMIA: ICD-10-CM

## 2025-08-06 DIAGNOSIS — I10 HTN (HYPERTENSION), BENIGN: ICD-10-CM

## 2025-08-06 DIAGNOSIS — Z00.00 MEDICARE ANNUAL WELLNESS VISIT, SUBSEQUENT: Primary | ICD-10-CM

## 2025-08-06 PROCEDURE — 1159F MED LIST DOCD IN RCRD: CPT | Performed by: FAMILY MEDICINE

## 2025-08-06 PROCEDURE — 3075F SYST BP GE 130 - 139MM HG: CPT | Performed by: FAMILY MEDICINE

## 2025-08-06 PROCEDURE — G0009 ADMIN PNEUMOCOCCAL VACCINE: HCPCS | Performed by: FAMILY MEDICINE

## 2025-08-06 PROCEDURE — G0439 PPPS, SUBSEQ VISIT: HCPCS | Performed by: FAMILY MEDICINE

## 2025-08-06 PROCEDURE — 90677 PCV20 VACCINE IM: CPT | Performed by: FAMILY MEDICINE

## 2025-08-06 PROCEDURE — 1036F TOBACCO NON-USER: CPT | Performed by: FAMILY MEDICINE

## 2025-08-06 PROCEDURE — 99497 ADVNCD CARE PLAN 30 MIN: CPT | Performed by: FAMILY MEDICINE

## 2025-08-06 PROCEDURE — 3079F DIAST BP 80-89 MM HG: CPT | Performed by: FAMILY MEDICINE

## 2025-08-06 RX ORDER — AMLODIPINE BESYLATE 2.5 MG/1
2.5 TABLET ORAL DAILY
Qty: 90 TABLET | Refills: 1 | Status: SHIPPED | OUTPATIENT
Start: 2025-08-06

## 2025-08-06 RX ORDER — PRAVASTATIN SODIUM 80 MG/1
80 TABLET ORAL DAILY
Qty: 90 TABLET | Refills: 1 | Status: SHIPPED | OUTPATIENT
Start: 2025-08-06

## 2025-08-06 RX ORDER — TRIAMTERENE AND HYDROCHLOROTHIAZIDE 37.5; 25 MG/1; MG/1
1 CAPSULE ORAL DAILY
Qty: 90 CAPSULE | Refills: 1 | Status: SHIPPED | OUTPATIENT
Start: 2025-08-06

## 2025-08-06 ASSESSMENT — PATIENT HEALTH QUESTIONNAIRE - PHQ9
2. FEELING DOWN, DEPRESSED OR HOPELESS: NOT AT ALL
SUM OF ALL RESPONSES TO PHQ9 QUESTIONS 1 AND 2: 0
1. LITTLE INTEREST OR PLEASURE IN DOING THINGS: NOT AT ALL

## 2025-08-06 NOTE — PROGRESS NOTES
Advance Care Planning Note     Discussion Date: 08/06/25   Discussion Participants: patient    The patient wishes to discuss Advance Care Planning today and the following is a brief summary of our discussion.     Patient has capacity to make their own medical decisions: Yes  Health Care Agent/Surrogate Decision Maker documented in chart: Yes    Documents on file and valid:  Advance Directive/Living Will: Yes   Health Care Power of : Yes  Other: Wood    Communication of Medical Status/Prognosis:   tbs     Communication of Treatment Goals/Options:   tbs     Treatment Decisions  Goals of Care: survival is paramount regardless of prognosis, treatment outcome, or burden   tbs  Follow Up Plan  tbs  Team Members  tbs  Time Statement: Total face to face time spent on advance care planning was >15 minutes with 16 minutes spent in counseling, including the explanation.  Greater than 5 minutes PHQ-9 reviewed..  10 minutes cardiac and lung screening both.  Patient still with lung screening following up with cardiac thoracic surgeon.  Comes back in a year for repeat scan.  5 minutes obesity discussed.    Patient's cholesterol, blood pressure stable.  Has all of her family and from [place] and they are buying a new house down the street.  Also with her  Per his finally been able to get into [employer] pain management after their doctor retired after 30 years    REVIEW OF SYSTEMS: 12 systems negative except for those mentioned in the HPI. Reviewed home risks with patient. Patient feels safe at home.    PHYSICAL EXAMINATION:   Constitutional: The patient is alert and oriented x3, in no acute  distress.  Eyes: Extraocular movements are intact. Pupils are equal and reactive to light  ENT: Bilateral TMs within normal limits. Nasal turbinates are within normal limits. Throat within normal limits.  Neck: Supple without lymphadenopathy or JVD.  Heart: Regular rate and rhythm without murmur, click, gallop, or  rub.  Lungs: Clear to auscultation bilaterally. No rales, rhonchi, or  wheezing.  Abdomen: Soft, nontender, nondistended. Normoactive bowel sounds.   No palpable masses. Normal percussion  Musculoskeletal: 5/5 motor, upper and lower extremities.  Neurologic: Cranial nerves II through XII fully intact. +2/4 DTRs  in ankle and knee.  Psychiatric: Good judgment and insight. Normal affect and mood. No cognitive defects noted.  Lymphatic: Negative as noted above.  Skin: no rashes or lesions    Assessment:  Per EMR    Plan:  I will update Prevnar in 5 years.  Colonoscopy screening is up-to-date until next year with John Paul.  Otherwise doing well blood work is up-to-date through Dr. Muller will follow-up next year in 6 months  Discussed with the patient and reviewed annual wellness questionnaire form as well as cognitive deficits. Also discussed with the patient immunizations and risks for colonoscopy and other screening procedures    This dictation was created using Dragon dictation and may contain errors    Anish Barnes DO  8/6/2025 9:08 AMPatient was identified as a fall risk. Risk prevention instructions provided.

## 2026-02-09 ENCOUNTER — APPOINTMENT (OUTPATIENT)
Dept: PRIMARY CARE | Facility: CLINIC | Age: 76
End: 2026-02-09
Payer: MEDICARE